# Patient Record
Sex: MALE | Race: BLACK OR AFRICAN AMERICAN | NOT HISPANIC OR LATINO | Employment: UNEMPLOYED | URBAN - METROPOLITAN AREA
[De-identification: names, ages, dates, MRNs, and addresses within clinical notes are randomized per-mention and may not be internally consistent; named-entity substitution may affect disease eponyms.]

---

## 2019-02-22 ENCOUNTER — TELEPHONE (OUTPATIENT)
Dept: TRANSPLANT | Facility: CLINIC | Age: 54
End: 2019-02-22

## 2019-02-22 DIAGNOSIS — I50.9 CONGESTIVE HEART FAILURE, UNSPECIFIED HF CHRONICITY, UNSPECIFIED HEART FAILURE TYPE: Primary | ICD-10-CM

## 2019-02-25 NOTE — TELEPHONE ENCOUNTER
Spoke to Anahi with Dr Chou's office. Clinic notes requested and asking for clarification about reason for referral.

## 2019-02-25 NOTE — TELEPHONE ENCOUNTER
REFERRAL NOTE:    Jose Collado has been referred to the pre-heart transplant office for consideration for orthotopic heart transplantation by Dr CHUCKY Iraheta. Patient's appointments have been scheduled for 03/01/19.  Information was provided and questions were answered. AHF/ Transplant Handout, apppointment letter, and campus map was mailed to the patient. Pleasant. My contact information was given. Records received from referring provider,scanned into [email or web address] and sent to be scanned into Epic. Spoke to Melanie from referring providers office. Informed of appointment as scheduled with Dr Real. Call PRN.

## 2019-03-01 ENCOUNTER — INITIAL CONSULT (OUTPATIENT)
Dept: TRANSPLANT | Facility: CLINIC | Age: 54
End: 2019-03-01
Payer: COMMERCIAL

## 2019-03-01 ENCOUNTER — DOCUMENTATION ONLY (OUTPATIENT)
Dept: TRANSPLANT | Facility: CLINIC | Age: 54
End: 2019-03-01

## 2019-03-01 ENCOUNTER — HOSPITAL ENCOUNTER (OUTPATIENT)
Dept: PULMONOLOGY | Facility: CLINIC | Age: 54
Discharge: HOME | End: 2019-03-01
Payer: COMMERCIAL

## 2019-03-01 ENCOUNTER — HOSPITAL ENCOUNTER (OUTPATIENT)
Dept: CARDIOLOGY | Facility: CLINIC | Age: 54
Discharge: HOME | End: 2019-03-01
Attending: INTERNAL MEDICINE
Payer: COMMERCIAL

## 2019-03-01 VITALS
HEART RATE: 60 BPM | WEIGHT: 189 LBS | DIASTOLIC BLOOD PRESSURE: 72 MMHG | HEIGHT: 77 IN | BODY MASS INDEX: 22.32 KG/M2 | SYSTOLIC BLOOD PRESSURE: 123 MMHG

## 2019-03-01 VITALS
WEIGHT: 185 LBS | HEIGHT: 77 IN | BODY MASS INDEX: 21.84 KG/M2 | BODY MASS INDEX: 22.41 KG/M2 | HEIGHT: 77 IN | HEART RATE: 60 BPM | DIASTOLIC BLOOD PRESSURE: 72 MMHG | SYSTOLIC BLOOD PRESSURE: 123 MMHG | WEIGHT: 189.81 LBS

## 2019-03-01 DIAGNOSIS — I48.0 PAROXYSMAL ATRIAL FIBRILLATION: ICD-10-CM

## 2019-03-01 DIAGNOSIS — I50.9 CONGESTIVE HEART FAILURE, UNSPECIFIED HF CHRONICITY, UNSPECIFIED HEART FAILURE TYPE: ICD-10-CM

## 2019-03-01 DIAGNOSIS — E78.2 MIXED HYPERLIPIDEMIA: ICD-10-CM

## 2019-03-01 DIAGNOSIS — I50.22 CHRONIC SYSTOLIC CONGESTIVE HEART FAILURE: Primary | ICD-10-CM

## 2019-03-01 DIAGNOSIS — I50.42 CHRONIC COMBINED SYSTOLIC AND DIASTOLIC CONGESTIVE HEART FAILURE: ICD-10-CM

## 2019-03-01 DIAGNOSIS — I25.3 LEFT VENTRICULAR ANEURYSM: ICD-10-CM

## 2019-03-01 DIAGNOSIS — Z95.810 ICD (IMPLANTABLE CARDIOVERTER-DEFIBRILLATOR) IN PLACE: ICD-10-CM

## 2019-03-01 DIAGNOSIS — I25.10 CORONARY ARTERY DISEASE INVOLVING NATIVE CORONARY ARTERY OF NATIVE HEART WITHOUT ANGINA PECTORIS: ICD-10-CM

## 2019-03-01 LAB
ASCENDING AORTA: 3.4 CM
AV INDEX (PROSTH): 0.48
AV MEAN GRADIENT: 4.26 MMHG
AV PEAK GRADIENT: 8.18 MMHG
AV VALVE AREA: 1.66 CM2
AV VELOCITY RATIO: 0.52
BSA FOR ECHO PROCEDURE: 2.16 M2
CV ECHO LV RWT: 0.28 CM
DOP CALC AO PEAK VEL: 1.43 M/S
DOP CALC AO VTI: 27.43 CM
DOP CALC LVOT AREA: 3.43 CM2
DOP CALC LVOT DIAMETER: 2.09 CM
DOP CALC LVOT PEAK VEL: 0.75 M/S
DOP CALC LVOT STROKE VOLUME: 45.47 CM3
DOP CALCLVOT PEAK VEL VTI: 13.26 CM
E WAVE DECELERATION TIME: 166.09 MSEC
E/A RATIO: 0.94
E/E' RATIO: 8.73
ECHO LV POSTERIOR WALL: 0.85 CM (ref 0.6–1.1)
FRACTIONAL SHORTENING: 18 % (ref 28–44)
INTERVENTRICULAR SEPTUM: 0.71 CM (ref 0.6–1.1)
IVRT: 0.16 MSEC
LA MAJOR: 5.74 CM
LA MINOR: 5.88 CM
LA WIDTH: 4.44 CM
LEFT ATRIUM SIZE: 4.79 CM
LEFT ATRIUM VOLUME INDEX: 48.1 ML/M2
LEFT ATRIUM VOLUME: 105.01 CM3
LEFT INTERNAL DIMENSION IN SYSTOLE: 4.99 CM (ref 2.1–4)
LEFT VENTRICLE DIASTOLIC VOLUME INDEX: 84.14 ML/M2
LEFT VENTRICLE DIASTOLIC VOLUME: 183.72 ML
LEFT VENTRICLE MASS INDEX: 83.8 G/M2
LEFT VENTRICLE SYSTOLIC VOLUME INDEX: 53.8 ML/M2
LEFT VENTRICLE SYSTOLIC VOLUME: 117.57 ML
LEFT VENTRICULAR INTERNAL DIMENSION IN DIASTOLE: 6.05 CM (ref 3.5–6)
LEFT VENTRICULAR MASS: 183.03 G
LV LATERAL E/E' RATIO: 8
LV SEPTAL E/E' RATIO: 9.6
MV PEAK A VEL: 0.51 M/S
MV PEAK E VEL: 0.48 M/S
PULM VEIN S/D RATIO: 0.83
PV PEAK D VEL: 0.35 M/S
PV PEAK S VEL: 0.29 M/S
RA MAJOR: 5.23 CM
RA PRESSURE: 3 MMHG
RA WIDTH: 3.72 CM
RIGHT VENTRICULAR END-DIASTOLIC DIMENSION: 4.56 CM
RV TISSUE DOPPLER FREE WALL SYSTOLIC VELOCITY 1 (APICAL 4 CHAMBER VIEW): 9.62 M/S
SINUS: 2.96 CM
STJ: 2.85 CM
TDI LATERAL: 0.06
TDI SEPTAL: 0.05
TDI: 0.06
TRICUSPID ANNULAR PLANE SYSTOLIC EXCURSION: 1.59 CM

## 2019-03-01 PROCEDURE — 99999 PR PBB SHADOW E&M-EST. PATIENT-LVL III: ICD-10-PCS | Mod: PBBFAC,TXP,, | Performed by: INTERNAL MEDICINE

## 2019-03-01 PROCEDURE — 93306 TTE W/DOPPLER COMPLETE: CPT | Mod: NTX,S$GLB,, | Performed by: INTERNAL MEDICINE

## 2019-03-01 PROCEDURE — 93306 TRANSTHORACIC ECHO (TTE) COMPLETE (CUPID ONLY): ICD-10-PCS | Mod: NTX,S$GLB,, | Performed by: INTERNAL MEDICINE

## 2019-03-01 PROCEDURE — 99204 OFFICE O/P NEW MOD 45 MIN: CPT | Mod: S$GLB,TXP,, | Performed by: INTERNAL MEDICINE

## 2019-03-01 PROCEDURE — 94618 PULMONARY STRESS TESTING: CPT | Mod: NTX,S$GLB,, | Performed by: INTERNAL MEDICINE

## 2019-03-01 PROCEDURE — 3008F PR BODY MASS INDEX (BMI) DOCUMENTED: ICD-10-PCS | Mod: CPTII,S$GLB,TXP, | Performed by: INTERNAL MEDICINE

## 2019-03-01 PROCEDURE — 99999 PR PBB SHADOW E&M-EST. PATIENT-LVL III: CPT | Mod: PBBFAC,TXP,, | Performed by: INTERNAL MEDICINE

## 2019-03-01 PROCEDURE — 99204 PR OFFICE/OUTPT VISIT, NEW, LEVL IV, 45-59 MIN: ICD-10-PCS | Mod: S$GLB,TXP,, | Performed by: INTERNAL MEDICINE

## 2019-03-01 PROCEDURE — 94618 PULMONARY STRESS TESTING: ICD-10-PCS | Mod: NTX,S$GLB,, | Performed by: INTERNAL MEDICINE

## 2019-03-01 PROCEDURE — 3008F BODY MASS INDEX DOCD: CPT | Mod: CPTII,S$GLB,TXP, | Performed by: INTERNAL MEDICINE

## 2019-03-01 RX ORDER — SERTRALINE HYDROCHLORIDE 25 MG/1
25 TABLET, FILM COATED ORAL DAILY
COMMUNITY
Start: 2018-06-18 | End: 2019-11-25 | Stop reason: SDUPTHER

## 2019-03-01 RX ORDER — POTASSIUM CHLORIDE 20 MEQ/1
40 TABLET, EXTENDED RELEASE ORAL DAILY
COMMUNITY
Start: 2018-06-19

## 2019-03-01 RX ORDER — METOPROLOL SUCCINATE 100 MG/1
100 TABLET, EXTENDED RELEASE ORAL 2 TIMES DAILY
COMMUNITY
Start: 2018-08-14 | End: 2019-10-08 | Stop reason: CLARIF

## 2019-03-01 RX ORDER — TRAZODONE HYDROCHLORIDE 50 MG/1
50 TABLET ORAL NIGHTLY
COMMUNITY
Start: 2018-06-18 | End: 2019-12-17 | Stop reason: SDUPTHER

## 2019-03-01 RX ORDER — ALBUTEROL SULFATE 90 UG/1
AEROSOL, METERED RESPIRATORY (INHALATION)
COMMUNITY
Start: 2018-09-24 | End: 2019-09-09

## 2019-03-01 RX ORDER — ASPIRIN 81 MG/1
81 TABLET ORAL DAILY
COMMUNITY
Start: 2018-06-19 | End: 2022-08-16 | Stop reason: SDUPTHER

## 2019-03-01 RX ORDER — FUROSEMIDE 40 MG/1
40 TABLET ORAL DAILY
COMMUNITY
Start: 2019-02-23 | End: 2020-08-13

## 2019-03-01 RX ORDER — ATORVASTATIN CALCIUM 80 MG/1
80 TABLET, FILM COATED ORAL DAILY
COMMUNITY
Start: 2018-06-19 | End: 2020-03-13

## 2019-03-01 RX ORDER — SPIRONOLACTONE 50 MG/1
50 TABLET, FILM COATED ORAL DAILY
COMMUNITY
Start: 2019-02-23 | End: 2020-02-18

## 2019-03-01 NOTE — PROGRESS NOTES
Subjective:   Initial evaluation of heart transplant candidacy.    HPI:  Mr. Collado is a 53 y.o. year old Black or  male who has presents to be considered for advanced surgical options (LVAD/OHT).    53 year old male with history of HTN s/p AMI (chest pain) LAD stent in June 2018. He did well but was hospitalized with fluid overload twice and recently in February 2019. Now he is on enrtesto that was added to medical therapy including aldactone. See notes below (although I have not seen it there is an echo with 20% EF). S/p ICD    Six minute 1200 feet    At this moment he is FC II NYHA and no PND or orthopnea. No fluid retention in lower extremities        2018   o The left ventricle is mildly dilated.   o Ejection Fraction = 50-55%.   o The left ventricular ejection fraction is grossly normal.   o There is mild concentric left ventricular hypertrophy.   o There is mild anterior wall hypokinesis.        - Coronary angiography data  Dominance :co-dominant system.   Left main: Large caliber vessel, divides into the left anterior   descending and left circumflex. LM is Non-obstructive   Left anterior descending: Large caliber vessel giving rise to 3   major diagonals. Mid LAD 99% stenosis. Other wise LAD and   diagonals are Non-obstructive   Left circumflex:  Medium  caliber vessel giving rise to 2 major   obtuse marginals. LCX  And OM are Non-obstructive   Right coronary artery: Small caliber vessel. Distal RCA .   Distal RCA fills via collaterals from right to right.     - Left Heart Catheterization data  Aortic Pressure: 98/76 mmHg  LVEDP 31 mmHg  LV gram 55-60 %  Ao-LV gradients: none.     - IVUS data  Pre PCI IVUS data:  Proximal reference luminal diameter:  3.5 mm  Distal reference luminal diameter: 3.5 mm     Percutaneous Coronary Intervention  Successful PCI of Mid left anterior descending  99 % stenosis was   reduced to 0 % by pre dilatation with 2.5 x 15 mm balloon   followed by placement of  3.5 x 24 mm Promus Premier SENAIT. post   dilated with 3.5 x 20 mm balloon with excellent angiographic   results. IVUS guided.     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  - Succesful PCI of Mid left anterior descending artery with one   SENAIT   - Distal RCA  (small vessel)  - Increased left ventricular end diastolic pressure.    Clinic visit January 2019    This is a 53-year-old male who recently underwent LAD stenting for ST elevation MI, ischemic cardiomyopathy with EF of 35-40% post STEMI, hospital course complicated by paroxysmal atrial fibrillation and cardiogenic shock. his most recent EF is 20-25%. He is status post ICD placement for primary prevention.           Past Medical History:   Diagnosis Date    Acute hypoxemic respiratory failure     Acute kidney injury     Aneurysm     CHF (congestive heart failure)     Coronary artery disease     Diabetic amyotrophy associated with type 2 diabetes mellitus     Edema due to congestive heart failure     Fever     Heart failure     High blood pressure     History of respiratory failure     Hypertension     ICD (implantable cardioverter-defibrillator) in place     Inflammation of lung     Irregular heartbeat     SOB (shortness of breath)      Past Surgical History:   Procedure Laterality Date    atrial difibrillator  11/26/2018       Family History   Problem Relation Age of Onset    Hypertension Mother        Review of Systems   Constitution: Negative. Negative for chills, decreased appetite, diaphoresis, fever, weakness, malaise/fatigue, night sweats, weight gain and weight loss.   HENT: Negative.    Eyes: Negative.    Cardiovascular: Negative for chest pain, claudication, cyanosis, dyspnea on exertion, irregular heartbeat, leg swelling, near-syncope, orthopnea and palpitations.   Respiratory: Negative.  Negative for cough, hemoptysis, shortness of breath, sleep  "disturbances due to breathing, snoring, sputum production and wheezing.    Endocrine: Negative.    Hematologic/Lymphatic: Negative.    Skin: Negative.    Musculoskeletal: Negative.    Gastrointestinal: Negative.  Negative for abdominal pain and diarrhea.   Genitourinary: Negative.    Neurological: Negative.    Psychiatric/Behavioral: Negative.        Objective:   Blood pressure 123/72, pulse 60, height 6' 5" (1.956 m), weight 86.1 kg (189 lb 13.1 oz).body mass index is 22.51 kg/m².    Physical Exam   Constitutional: He is oriented to person, place, and time. He appears well-developed and well-nourished.   HENT:   Head: Normocephalic and atraumatic.   Eyes: Conjunctivae and EOM are normal. Pupils are equal, round, and reactive to light.   Neck: Normal range of motion. Neck supple. JVD present.   Cardiovascular: Normal rate and regular rhythm. PMI is displaced. Exam reveals no gallop and no friction rub.   No murmur heard.  Pulmonary/Chest: Breath sounds normal. No respiratory distress. He has no wheezes. He has no rales. He exhibits no tenderness.   Abdominal: Soft. Bowel sounds are normal. He exhibits no distension and no mass. There is no hepatosplenomegaly, splenomegaly or hepatomegaly. There is no tenderness. There is no rebound, no guarding and no CVA tenderness.   No hepatoslenomegaly   Musculoskeletal: Normal range of motion. He exhibits no edema or tenderness.   Neurological: He is alert and oriented to person, place, and time. He has normal reflexes. No cranial nerve deficit. He exhibits normal muscle tone. Coordination normal.   Skin: Skin is warm and dry.   Psychiatric: He has a normal mood and affect.       Labs:      Chemistry        Component Value Date/Time     03/01/2019 0813    K 4.8 03/01/2019 0813     03/01/2019 0813    CO2 29 03/01/2019 0813    BUN 19 03/01/2019 0813    CREATININE 1.2 03/01/2019 0813     (H) 03/01/2019 0813        Component Value Date/Time    CALCIUM 9.7 " 03/01/2019 0813    ALKPHOS 288 (H) 03/01/2019 0813    AST 50 (H) 03/01/2019 0813    ALT 46 (H) 03/01/2019 0813    BILITOT 1.0 03/01/2019 0813    ESTGFRAFRICA >60.0 03/01/2019 0813    EGFRNONAA >60.0 03/01/2019 0813          No results found for: MG  Lab Results   Component Value Date    WBC 4.77 03/01/2019    HGB 13.2 (L) 03/01/2019    HCT 43.6 03/01/2019    MCV 83 03/01/2019     03/01/2019     BNP   Date Value Ref Range Status   03/01/2019 563 (H) 0 - 99 pg/mL Final     Comment:     Values of less than 100 pg/ml are consistent with non-CHF populations.     No results found for this or any previous visit.    Labs were reviewed with the patient.    Assessment:      1. Chronic systolic congestive heart failure    2. Chronic combined systolic and diastolic congestive heart failure    3. Coronary artery disease involving native coronary artery of native heart without angina pectoris    4. ICD (implantable cardioverter-defibrillator) in place    5. Left ventricular aneurysm    6. Mixed hyperlipidemia    7. Paroxysmal atrial fibrillation        Plan:     HFrEF 1200 feet. We will perform CPX and PET scan (rule out ischemia)    RTC 3 weeks    We will reassess candidacy      Patient is now NYHA II ACC stage D  Recommend 2 gram sodium restriction and 1500cc fluid restriction.  Encourage physical activity with graded exercise program.  Requested patient to weigh themselves daily, and to notify us if their weight increases by more than 3 lbs in 1 day or 5 lbs in 1 week.     Transplant Candidacy: Patient is a 53 y.o. year old male with heart failure is being seen for possible LVAD and OHT. In my opinion, he is  an excellent LVAD and OHT candidate. Patient did  meet with MCS and/or pre-transplant coordinator at the end of this visit for workup. he is  scheduled for risk stratification testing with CPX/RHC. The patient will follow up with pre-transplant.    UNOS Patient Status  Functional Status: 80% - Normal activity with  effort: some symptoms of disease  Physical Capacity: Limited Mobility  Working for Income: No  If no, reason not working: Disability    Rufino Real MD

## 2019-03-01 NOTE — LETTER
March 1, 2019        José Antonio Iraheta  [address] [address]  Phone: [phone]  Fax: [phone]             [facility] [address]  [facility] [address]  Phone: [phone]   Patient: Jose Collado   MR Number: [id number]   YOB: 1965   Date of Visit: 3/1/2019       Dear Dr. José Antonio Iraheta    Thank you for referring Jose Collado to me for evaluation. Attached you will find relevant portions of my assessment and plan of care.    If you have questions, please do not hesitate to call me. I look forward to following Jose Collado along with you.    Sincerely,    Rufino Real MD    Enclosure    If you would like to receive this communication electronically, please contact [email or web address] or [phone] to request [employer] access.    [employer] is a tool which provides read-only access to select patient information with whom you have a relationship. Its easy to use and provides real time access to review your patients record including encounter summaries, notes, results, and demographic information.    If you feel you have received this communication in error or would no longer like to receive these types of communications, please e-mail [email or web address]

## 2019-03-04 NOTE — PROCEDURES
Jose Collado is a 53 y.o.  male patient, who presents for a 6 minute walk test ordered by Rufino Real MD.  The diagnosis is Cardiomyopathy; Congestive Heart Failure.  The patient's BMI is 22 kg/m2.  Predicted distance (lower limit of normal) is 502.7 meters.      Test Results:    The test was completed without stopping.  The total time walked was 360 seconds.  During walking, the patient reported:  No complaints.  The patient used no assistive devices during testing.     03/01/2019---------Distance: 365.76 meters (1200 feet)     O2 Sat % Supplemental Oxygen Heart Rate Blood Pressure Sofia Scale   Pre-exercise  (Resting) 98 % Room Air 60 bpm 124/89 mmHg 0   During Exercise 94 % Room Air 60 bpm 121/79 mmHg 0   Post-exercise  (Recovery) 96 % Room Air  61 bpm       Recovery Time:  105 seconds    Performing nurse/tech:  Malinda CRT      PREVIOUS STUDY:   The patient has not had a previous study.      CLINICAL INTERPRETATION:  Six minute walk distance is 365.76 meters (1200 feet) with no dyspnea.  During exercise, there was desaturation while breathing room air.  Both blood pressure and heart rate remained stable with walking.  The patient did not report non-pulmonary symptoms during exercise.  No previous study performed.  Based upon age and body mass index, exercise capacity is less than predicted.

## 2019-03-08 DIAGNOSIS — I50.22 CHRONIC SYSTOLIC CONGESTIVE HEART FAILURE: Primary | ICD-10-CM

## 2019-03-11 ENCOUNTER — PATIENT MESSAGE (OUTPATIENT)
Dept: TRANSPLANT | Facility: CLINIC | Age: 54
End: 2019-03-11

## 2019-04-01 ENCOUNTER — CLINICAL SUPPORT (OUTPATIENT)
Dept: CARDIOLOGY | Facility: CLINIC | Age: 54
End: 2019-04-01
Attending: INTERNAL MEDICINE
Payer: COMMERCIAL

## 2019-04-01 ENCOUNTER — OFFICE VISIT (OUTPATIENT)
Dept: TRANSPLANT | Facility: CLINIC | Age: 54
End: 2019-04-01
Payer: COMMERCIAL

## 2019-04-01 ENCOUNTER — HOSPITAL ENCOUNTER (OUTPATIENT)
Dept: CARDIOLOGY | Facility: CLINIC | Age: 54
Discharge: HOME | End: 2019-04-01
Attending: INTERNAL MEDICINE
Payer: COMMERCIAL

## 2019-04-01 ENCOUNTER — TELEPHONE (OUTPATIENT)
Dept: TRANSPLANT | Facility: CLINIC | Age: 54
End: 2019-04-01

## 2019-04-01 ENCOUNTER — EDUCATION (OUTPATIENT)
Dept: TRANSPLANT | Facility: CLINIC | Age: 54
End: 2019-04-01

## 2019-04-01 VITALS — WEIGHT: 206.38 LBS | HEIGHT: 77 IN | BODY MASS INDEX: 24.37 KG/M2

## 2019-04-01 VITALS
DIASTOLIC BLOOD PRESSURE: 83 MMHG | WEIGHT: 206.81 LBS | HEIGHT: 78 IN | BODY MASS INDEX: 23.93 KG/M2 | HEART RATE: 59 BPM | SYSTOLIC BLOOD PRESSURE: 130 MMHG

## 2019-04-01 DIAGNOSIS — I50.22 CHRONIC SYSTOLIC CONGESTIVE HEART FAILURE: ICD-10-CM

## 2019-04-01 DIAGNOSIS — I48.0 PAROXYSMAL ATRIAL FIBRILLATION: ICD-10-CM

## 2019-04-01 DIAGNOSIS — I25.3 LEFT VENTRICULAR ANEURYSM: ICD-10-CM

## 2019-04-01 DIAGNOSIS — Z95.810 ICD (IMPLANTABLE CARDIOVERTER-DEFIBRILLATOR) IN PLACE: ICD-10-CM

## 2019-04-01 DIAGNOSIS — I25.10 CORONARY ARTERY DISEASE INVOLVING NATIVE CORONARY ARTERY OF NATIVE HEART WITHOUT ANGINA PECTORIS: ICD-10-CM

## 2019-04-01 DIAGNOSIS — I50.42 CHRONIC COMBINED SYSTOLIC AND DIASTOLIC CONGESTIVE HEART FAILURE: Primary | ICD-10-CM

## 2019-04-01 DIAGNOSIS — E78.2 MIXED HYPERLIPIDEMIA: ICD-10-CM

## 2019-04-01 LAB
CV STRESS BASE HR: 60 BPM
DIASTOLIC BLOOD PRESSURE: 91 MMHG
OHS CV CPX 1 MINUTE RECOVERY HEART RATE: 66 BPM
OHS CV CPX 85 PERCENT MAX PREDICTED HEART RATE MALE: 143
OHS CV CPX ANAEROBIC THRESHOLD DIASTOLIC BLOOD PRESSURE: 81 MMHG
OHS CV CPX ANAEROBIC THRESHOLD HEART RATE: 79
OHS CV CPX ANAEROBIC THRESHOLD RATE PRESSURE PRODUCT: 9480
OHS CV CPX ANAEROBIC THRESHOLD SYSTOLIC BLOOD PRESSURE: 120
OHS CV CPX DATA GRADE - AT: 5.9
OHS CV CPX DATA GRADE - PEAK: 7
OHS CV CPX DATA O2 SAT - PEAK: 92
OHS CV CPX DATA O2 SAT - REST: 98
OHS CV CPX DATA SPEED - AT: 2.1
OHS CV CPX DATA SPEED - PEAK: 2.4
OHS CV CPX DATA TIME - AT: 2.81
OHS CV CPX DATA TIME - PEAK: 3.3
OHS CV CPX DATA VE/VCO2 - AT: 28
OHS CV CPX DATA VE/VCO2 - PEAK: 37
OHS CV CPX DATA VE/VO2 - AT: 27
OHS CV CPX DATA VE/VO2 - PEAK: 44
OHS CV CPX DATA VO2 - AT: 12.2
OHS CV CPX DATA VO2 - PEAK: 12.7
OHS CV CPX DATA VO2 - REST: 3.7
OHS CV CPX FEV1/FVC: 0.6
OHS CV CPX FORCED EXPIRATORY VOLUME: 2.38
OHS CV CPX FORCED VITAL CAPACITY (FVC): 3.95
OHS CV CPX HIGHEST VO: 34.6
OHS CV CPX MAX PREDICTED HEART RATE: 168
OHS CV CPX MAXIMAL VOLUNTARY VENTILATION (MVV) PREDICTED: 95.2
OHS CV CPX MAXIMAL VOLUNTARY VENTILATION (MVV): 76
OHS CV CPX MAXIUMUM EXERCISE VENTILATION (VE MAX): 50
OHS CV CPX PATIENT AGE: 52
OHS CV CPX PATIENT HEIGHT IN: 77
OHS CV CPX PATIENT IS FEMALE AGE 11-19: 0
OHS CV CPX PATIENT IS FEMALE AGE GREATER THAN 19: 0
OHS CV CPX PATIENT IS FEMALE AGE LESS THAN 11: 0
OHS CV CPX PATIENT IS FEMALE: 0
OHS CV CPX PATIENT IS MALE AGE 11-25: 0
OHS CV CPX PATIENT IS MALE AGE GREATER THAN 25: 1
OHS CV CPX PATIENT IS MALE AGE LESS THAN 11: 0
OHS CV CPX PATIENT IS MALE GREATER THAN 18: 1
OHS CV CPX PATIENT IS MALE LESS THAN OR EQUAL TO 18: 0
OHS CV CPX PATIENT IS MALE: 1
OHS CV CPX PATIENT WEIGHT RETURNED IN OZ: 3301.61
OHS CV CPX PEAK DIASTOLIC BLOOD PRESSURE: 70 MMHG
OHS CV CPX PEAK HEAR RATE: 82 BPM
OHS CV CPX PEAK RATE PRESSURE PRODUCT: 9758
OHS CV CPX PEAK SYSTOLIC BLOOD PRESSURE: 119 MMHG
OHS CV CPX PERCENT BODY FAT: 18.7
OHS CV CPX PERCENT MAX PREDICTED HEART RATE ACHIEVED: 49
OHS CV CPX PREDICTED VO2: 34.6 ML/KG/MIN
OHS CV CPX RATE PRESSURE PRODUCT PRESENTING: 7680
OHS CV CPX REST PET CO2: 34
OHS CV CPX VE/VCO2 SLOPE: 28
STRESS ECHO POST EXERCISE DUR MIN: 3 MIN
STRESS ECHO POST EXERCISE DUR SEC: 18
SYSTOLIC BLOOD PRESSURE: 128 MMHG

## 2019-04-01 PROCEDURE — 94621 CARDIOPULMONARY EXERCISE TESTING (CUPID ONLY): ICD-10-PCS | Mod: NTX,S$GLB,, | Performed by: INTERNAL MEDICINE

## 2019-04-01 PROCEDURE — 99215 OFFICE O/P EST HI 40 MIN: CPT | Mod: S$PBB,NTX,, | Performed by: INTERNAL MEDICINE

## 2019-04-01 PROCEDURE — 94621 CARDIOPULM EXERCISE TESTING: CPT | Mod: NTX,S$GLB,, | Performed by: INTERNAL MEDICINE

## 2019-04-01 PROCEDURE — 99999 PR PBB SHADOW E&M-EST. PATIENT-LVL II: CPT | Mod: PBBFAC,TXP,, | Performed by: INTERNAL MEDICINE

## 2019-04-01 PROCEDURE — 99215 PR OFFICE/OUTPT VISIT, EST, LEVL V, 40-54 MIN: ICD-10-PCS | Mod: S$PBB,NTX,, | Performed by: INTERNAL MEDICINE

## 2019-04-01 PROCEDURE — 99999 PR PBB SHADOW E&M-EST. PATIENT-LVL II: ICD-10-PCS | Mod: PBBFAC,TXP,, | Performed by: INTERNAL MEDICINE

## 2019-04-01 NOTE — PROGRESS NOTES
PRE-EDUCATION BOOKLET NOTE:    Met with oJse Collado and had a brief discussion regarding the advanced heart failure evaluation process including options for heart transplantation and/or left ventricular assist device (LVAD) implantation.     Heart Transplant Educational Booklet given to patient, which included the following handouts:  · Treatment options for Advanced Heart Failure: A Referral Guide for patients  · Pre Heart Transplant Coordinator Team Contact Information   · Recipient Informed Consent   · Wellness Contract  · Multiple Listing Protocol and UNOS toll free numbers   · VAD Education Packet   · Advanced Directives  · 8 Step Plan for Heart Failure Patients     Significant History:  · Prior sternotomies: No  · ICD:  Yes, Medtronic 11/26/18  · Blood transfusions:  No  · Angiography:  Yes, through wrist  · Colonoscopy:  No  · Tobacco history:  No    Patient was accompanied by his spouse, Angie.  Question and answer session was conducted.  Patient was advised to bring the educational packet to future appointments and/or admissions.  Patient was encouraged to contact the coordinator team with any questions or concerns.  Contact information provided.  Pt was tearful at end of appointment.  Emotional support provided.  Understanding verbalized.    Will schedule eval upon receipt of financial clearance for outpatient eval.

## 2019-04-01 NOTE — PROGRESS NOTES
Subjective:   Initial evaluation of heart transplant candidacy.    HPI:  Mr. Collado is a 53 y.o. year old Black or  male who has presents to be considered for advanced surgical options (LVAD/OHT).    53 year old male with history of HTN s/p AMI (chest pain) LAD stent in June 2018. He did well but was hospitalized with fluid overload twice and recently in February 2019. Now he is on enrtesto that was added to medical therapy including aldactone. See notes below (although I have not seen it there is an echo with 20% EF). S/p ICD    Six minute 1200 feet    At this moment he is FC II NYHA and no PND or orthopnea. No fluid retention in lower extremities    Feels better on entresto less shortness of breath more stamina    Preliminary CPX    Peak VO2 12.7 cc/kg/min Ve Vco2 28 2018   o The left ventricle is mildly dilated.   o Ejection Fraction = 50-55%.   o The left ventricular ejection fraction is grossly normal.   o There is mild concentric left ventricular hypertrophy.   o There is mild anterior wall hypokinesis.        - Coronary angiography data  Dominance :co-dominant system.   Left main: Large caliber vessel, divides into the left anterior   descending and left circumflex. LM is Non-obstructive   Left anterior descending: Large caliber vessel giving rise to 3   major diagonals. Mid LAD 99% stenosis. Other wise LAD and   diagonals are Non-obstructive   Left circumflex:  Medium  caliber vessel giving rise to 2 major   obtuse marginals. LCX  And OM are Non-obstructive   Right coronary artery: Small caliber vessel. Distal RCA .   Distal RCA fills via collaterals from right to right.     - Left Heart Catheterization data  Aortic Pressure: 98/76 mmHg  LVEDP 31 mmHg  LV gram 55-60 %  Ao-LV gradients: none.     - IVUS data  Pre PCI IVUS data:  Proximal reference luminal diameter:  3.5 mm  Distal reference luminal diameter: 3.5 mm     Percutaneous Coronary Intervention  Successful PCI of Mid left  anterior descending  99 % stenosis was   reduced to 0 % by pre dilatation with 2.5 x 15 mm balloon   followed by placement of 3.5 x 24 mm Promus Premier SENAIT. post   dilated with 3.5 x 20 mm balloon with excellent angiographic   results. IVUS guided.     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  - Succesful PCI of Mid left anterior descending artery with one   SENAIT   - Distal RCA  (small vessel)  - Increased left ventricular end diastolic pressure.    Clinic visit January 2019    This is a 53-year-old male who recently underwent LAD stenting for ST elevation MI, ischemic cardiomyopathy with EF of 35-40% post STEMI, hospital course complicated by paroxysmal atrial fibrillation and cardiogenic shock. his most recent EF is 20-25%. He is status post ICD placement for primary prevention.           Past Medical History:   Diagnosis Date    Acute hypoxemic respiratory failure     Acute kidney injury     Aneurysm     CHF (congestive heart failure)     Coronary artery disease     Diabetic amyotrophy associated with type 2 diabetes mellitus     Edema due to congestive heart failure     Fever     Heart failure     High blood pressure     History of respiratory failure     Hypertension     ICD (implantable cardioverter-defibrillator) in place     Inflammation of lung     Irregular heartbeat     SOB (shortness of breath)      Past Surgical History:   Procedure Laterality Date    atrial difibrillator  11/26/2018       Family History   Problem Relation Age of Onset    Hypertension Mother        Review of Systems   Constitution: Negative. Negative for chills, decreased appetite, diaphoresis, fever, malaise/fatigue, night sweats, weight gain and weight loss.   HENT: Negative.    Eyes: Negative.    Cardiovascular: Negative for chest pain, claudication, cyanosis, dyspnea on exertion, irregular heartbeat, leg swelling, near-syncope,  "orthopnea and palpitations.   Respiratory: Negative.  Negative for cough, hemoptysis, shortness of breath, sleep disturbances due to breathing, snoring, sputum production and wheezing.    Endocrine: Negative.    Hematologic/Lymphatic: Negative.    Skin: Negative.    Musculoskeletal: Negative.    Gastrointestinal: Negative.  Negative for abdominal pain and diarrhea.   Genitourinary: Negative.    Neurological: Negative.  Negative for weakness.   Psychiatric/Behavioral: Negative.        Objective:   Blood pressure 130/83, pulse (!) 59, height 6' 5.5" (1.969 m), weight 93.8 kg (206 lb 12.7 oz).body mass index is 24.21 kg/m².    Physical Exam   Constitutional: He is oriented to person, place, and time. He appears well-developed and well-nourished.   HENT:   Head: Normocephalic and atraumatic.   Eyes: Pupils are equal, round, and reactive to light. Conjunctivae and EOM are normal.   Neck: Normal range of motion. Neck supple. JVD present.   Cardiovascular: Normal rate and regular rhythm. PMI is displaced. Exam reveals no gallop and no friction rub.   No murmur heard.  Pulmonary/Chest: Breath sounds normal. No respiratory distress. He has no wheezes. He has no rales. He exhibits no tenderness.   Abdominal: Soft. Bowel sounds are normal. He exhibits no distension and no mass. There is no hepatosplenomegaly, splenomegaly or hepatomegaly. There is no tenderness. There is no rebound, no guarding and no CVA tenderness.   No hepatoslenomegaly   Musculoskeletal: Normal range of motion. He exhibits no edema or tenderness.   Neurological: He is alert and oriented to person, place, and time. He has normal reflexes. No cranial nerve deficit. He exhibits normal muscle tone. Coordination normal.   Skin: Skin is warm and dry.   Psychiatric: He has a normal mood and affect.       Labs:      Chemistry        Component Value Date/Time     03/01/2019 0813    K 4.8 03/01/2019 0813     03/01/2019 0813    CO2 29 03/01/2019 0813    " BUN 19 03/01/2019 0813    CREATININE 1.2 03/01/2019 0813     (H) 03/01/2019 0813        Component Value Date/Time    CALCIUM 9.7 03/01/2019 0813    ALKPHOS 288 (H) 03/01/2019 0813    AST 50 (H) 03/01/2019 0813    ALT 46 (H) 03/01/2019 0813    BILITOT 1.0 03/01/2019 0813    ESTGFRAFRICA >60.0 03/01/2019 0813    EGFRNONAA >60.0 03/01/2019 0813          No results found for: MG  Lab Results   Component Value Date    WBC 4.77 03/01/2019    HGB 13.2 (L) 03/01/2019    HCT 43.6 03/01/2019    MCV 83 03/01/2019     03/01/2019     BNP   Date Value Ref Range Status   03/01/2019 563 (H) 0 - 99 pg/mL Final     Comment:     Values of less than 100 pg/ml are consistent with non-CHF populations.     No results found for this or any previous visit.    Labs were reviewed with the patient.    Assessment:      1. Chronic combined systolic and diastolic congestive heart failure    2. Chronic systolic congestive heart failure    3. Coronary artery disease involving native coronary artery of native heart without angina pectoris    4. ICD (implantable cardioverter-defibrillator) in place    5. Left ventricular aneurysm    6. Mixed hyperlipidemia    7. Paroxysmal atrial fibrillation        Plan:     HFrEF on entresto improving However CPX reveals a low peak VO2. Unable to do PET scan (brian latif)    Work up for advanced therapies      Patient is now NYHA II ACC stage D  Recommend 2 gram sodium restriction and 1500cc fluid restriction.  Encourage physical activity with graded exercise program.  Requested patient to weigh themselves daily, and to notify us if their weight increases by more than 3 lbs in 1 day or 5 lbs in 1 week.     Transplant Candidacy: Patient is a 53 y.o. year old male with heart failure is being seen for possible LVAD and OHT. In my opinion, he is  an excellent LVAD and OHT candidate. Patient did  meet with MCS and/or pre-transplant coordinator at the end of this visit for workup. he is  scheduled for  risk stratification testing with CPX/RHC. The patient will follow up with pre-transplant.    UNOS Patient Status  Functional Status: 80% - Normal activity with effort: some symptoms of disease  Physical Capacity: Limited Mobility  Working for Income: No  If no, reason not working: Disability    Rufino Real MD

## 2019-04-01 NOTE — LETTER
April 1, 2019        José Antonio Iraheta  [address] [address]  Phone: [phone]  Fax: [phone]             [facility] [address]  [facility] [address]  Phone: [phone]   Patient: Jose Collado   MR Number: [id number]   YOB: 1965   Date of Visit: 4/1/2019       Dear Dr. José Antonio Iraheta    Thank you for referring Jose Collado to me for evaluation. Attached you will find relevant portions of my assessment and plan of care.    If you have questions, please do not hesitate to call me. I look forward to following Jose Collado along with you.    Sincerely,    Rufino Real MD    Enclosure    If you would like to receive this communication electronically, please contact [email or web address] or [phone] to request [employer] Link access.    [employer] Link is a tool which provides read-only access to select patient information with whom you have a relationship. Its easy to use and provides real time access to review your patients record including encounter summaries, notes, results, and demographic information.    If you feel you have received this communication in error or would no longer like to receive these types of communications, please e-mail [email or web address]

## 2019-04-29 ENCOUNTER — RESEARCH ENCOUNTER (OUTPATIENT)
Dept: RESEARCH | Facility: HOSPITAL | Age: 54
End: 2019-04-29

## 2019-04-29 ENCOUNTER — DOCUMENTATION ONLY (OUTPATIENT)
Dept: TRANSPLANT | Facility: CLINIC | Age: 54
End: 2019-04-29

## 2019-04-29 ENCOUNTER — OFFICE VISIT (OUTPATIENT)
Dept: TRANSPLANT | Facility: CLINIC | Age: 54
End: 2019-04-29
Payer: COMMERCIAL

## 2019-04-29 ENCOUNTER — CLINICAL SUPPORT (OUTPATIENT)
Dept: CARDIOLOGY | Facility: CLINIC | Age: 54
End: 2019-04-29
Attending: INTERNAL MEDICINE
Payer: COMMERCIAL

## 2019-04-29 VITALS
BODY MASS INDEX: 25.41 KG/M2 | DIASTOLIC BLOOD PRESSURE: 85 MMHG | SYSTOLIC BLOOD PRESSURE: 105 MMHG | HEART RATE: 60 BPM | DIASTOLIC BLOOD PRESSURE: 68 MMHG | HEART RATE: 60 BPM | HEIGHT: 77 IN | SYSTOLIC BLOOD PRESSURE: 129 MMHG | WEIGHT: 215.19 LBS

## 2019-04-29 DIAGNOSIS — I25.10 CORONARY ARTERY DISEASE INVOLVING NATIVE CORONARY ARTERY OF NATIVE HEART WITHOUT ANGINA PECTORIS: ICD-10-CM

## 2019-04-29 DIAGNOSIS — I48.0 PAROXYSMAL ATRIAL FIBRILLATION: ICD-10-CM

## 2019-04-29 DIAGNOSIS — E78.2 MIXED HYPERLIPIDEMIA: ICD-10-CM

## 2019-04-29 DIAGNOSIS — I25.3 LEFT VENTRICULAR ANEURYSM: ICD-10-CM

## 2019-04-29 DIAGNOSIS — I50.42 CHRONIC COMBINED SYSTOLIC AND DIASTOLIC CONGESTIVE HEART FAILURE: Primary | ICD-10-CM

## 2019-04-29 DIAGNOSIS — Z95.810 ICD (IMPLANTABLE CARDIOVERTER-DEFIBRILLATOR) IN PLACE: ICD-10-CM

## 2019-04-29 LAB
CV STRESS BASE HR: 60 BPM
DIASTOLIC BLOOD PRESSURE: 85 MMHG
END DIASTOLIC INDEX-HIGH: 170 ML/M2
END SYSTOLIC INDEX-HIGH: 70 ML/M2
NUC REST DIASTOLIC VOLUME INDEX: 206
NUC REST EJECTION FRACTION: 38
NUC REST SYSTOLIC VOLUME INDEX: 127
NUC STRESS DIASTOLIC VOLUME INDEX: 186
NUC STRESS EJECTION FRACTION: 41 %
NUC STRESS SYSTOLIC VOLUME INDEX: 110
OHS CV CPX 85 PERCENT MAX PREDICTED HEART RATE MALE: 143
OHS CV CPX ANAEROBIC THRESHOLD DIASTOLIC BLOOD PRESSURE: 71 MMHG
OHS CV CPX ANAEROBIC THRESHOLD HEART RATE: 60
OHS CV CPX ANAEROBIC THRESHOLD RATE PRESSURE PRODUCT: 5880
OHS CV CPX ANAEROBIC THRESHOLD SYSTOLIC BLOOD PRESSURE: 98
OHS CV CPX MAX PREDICTED HEART RATE: 168
OHS CV CPX PATIENT IS FEMALE: 0
OHS CV CPX PATIENT IS MALE: 1
OHS CV CPX RATE PRESSURE PRODUCT PRESENTING: 6960
RETIRED EF AND QEF - SEE NOTES: 51 %
SYSTOLIC BLOOD PRESSURE: 116 MMHG

## 2019-04-29 PROCEDURE — A9555 RB82 RUBIDIUM: HCPCS | Mod: NTX,S$GLB,, | Performed by: INTERNAL MEDICINE

## 2019-04-29 PROCEDURE — 99999 PR PBB SHADOW E&M-EST. PATIENT-LVL I: ICD-10-PCS | Mod: PBBFAC,TXP,,

## 2019-04-29 PROCEDURE — 93015 CV STRESS TEST SUPVJ I&R: CPT | Mod: NTX,S$GLB,, | Performed by: INTERNAL MEDICINE

## 2019-04-29 PROCEDURE — 99999 PR PBB SHADOW E&M-EST. PATIENT-LVL III: CPT | Mod: PBBFAC,TXP,, | Performed by: INTERNAL MEDICINE

## 2019-04-29 PROCEDURE — 99215 PR OFFICE/OUTPT VISIT, EST, LEVL V, 40-54 MIN: ICD-10-PCS | Mod: S$PBB,TXP,, | Performed by: INTERNAL MEDICINE

## 2019-04-29 PROCEDURE — 99999 PR PBB SHADOW E&M-EST. PATIENT-LVL III: ICD-10-PCS | Mod: PBBFAC,TXP,, | Performed by: INTERNAL MEDICINE

## 2019-04-29 PROCEDURE — A9555 PR RB82 RUBIDIUM: ICD-10-PCS | Mod: NTX,S$GLB,, | Performed by: INTERNAL MEDICINE

## 2019-04-29 PROCEDURE — 99215 OFFICE O/P EST HI 40 MIN: CPT | Mod: S$PBB,TXP,, | Performed by: INTERNAL MEDICINE

## 2019-04-29 PROCEDURE — 99999 PR PBB SHADOW E&M-EST. PATIENT-LVL I: CPT | Mod: PBBFAC,TXP,,

## 2019-04-29 PROCEDURE — 93015 PR CARDIAC STRESS TST,COMPLETE: ICD-10-PCS | Mod: NTX,S$GLB,, | Performed by: INTERNAL MEDICINE

## 2019-04-29 RX ORDER — DIPYRIDAMOLE 5 MG/ML
52.44 INJECTION INTRAVENOUS
Status: COMPLETED | OUTPATIENT
Start: 2019-04-29 | End: 2019-04-29

## 2019-04-29 RX ADMIN — DIPYRIDAMOLE 52.45 MG: 5 INJECTION INTRAVENOUS at 10:04

## 2019-04-29 NOTE — PROGRESS NOTES
Subjective:   Initial evaluation of heart transplant candidacy.    HPI:  Mr. Collado is a 53 y.o. year old Black or  male who has presents to be considered for advanced surgical options (LVAD/OHT).    53 year old male with history of HTN s/p AMI (chest pain) LAD stent in June 2018. He did well but was hospitalized with fluid overload twice and recently in February 2019. Now he is on enrtesto that was added to medical therapy including aldactone. See notes below (although I have not seen it there is an echo with 20% EF). S/p ICD     Since last visit he is doing well VALDES FC II-III NYHA  PET Scan preliminary no ischemia On entresto     Six minute 1200 feet    At this moment he is FC II NYHA and no PND or orthopnea. No fluid retention in lower extremities    Feels better on entresto less shortness of breath more stamina    Preliminary CPX    Peak VO2 12.7 cc/kg/min Ve Vco2 28 2018   o The left ventricle is mildly dilated.   o Ejection Fraction = 50-55%.   o The left ventricular ejection fraction is grossly normal.   o There is mild concentric left ventricular hypertrophy.   o There is mild anterior wall hypokinesis.        - Coronary angiography data  Dominance :co-dominant system.   Left main: Large caliber vessel, divides into the left anterior   descending and left circumflex. LM is Non-obstructive   Left anterior descending: Large caliber vessel giving rise to 3   major diagonals. Mid LAD 99% stenosis. Other wise LAD and   diagonals are Non-obstructive   Left circumflex:  Medium  caliber vessel giving rise to 2 major   obtuse marginals. LCX  And OM are Non-obstructive   Right coronary artery: Small caliber vessel. Distal RCA .   Distal RCA fills via collaterals from right to right.     - Left Heart Catheterization data  Aortic Pressure: 98/76 mmHg  LVEDP 31 mmHg  LV gram 55-60 %  Ao-LV gradients: none.     - IVUS data  Pre PCI IVUS data:  Proximal reference luminal diameter:  3.5  mm  Distal reference luminal diameter: 3.5 mm     Percutaneous Coronary Intervention  Successful PCI of Mid left anterior descending  99 % stenosis was   reduced to 0 % by pre dilatation with 2.5 x 15 mm balloon   followed by placement of 3.5 x 24 mm Promus Premier SENAIT. post   dilated with 3.5 x 20 mm balloon with excellent angiographic   results. IVUS guided.     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  - Succesful PCI of Mid left anterior descending artery with one   SENAIT   - Distal RCA  (small vessel)  - Increased left ventricular end diastolic pressure.    Clinic visit January 2019    This is a 53-year-old male who recently underwent LAD stenting for ST elevation MI, ischemic cardiomyopathy with EF of 35-40% post STEMI, hospital course complicated by paroxysmal atrial fibrillation and cardiogenic shock. his most recent EF is 20-25%. He is status post ICD placement for primary prevention.           Past Medical History:   Diagnosis Date    Acute hypoxemic respiratory failure     Acute kidney injury     Aneurysm     CHF (congestive heart failure)     Coronary artery disease     Diabetic amyotrophy associated with type 2 diabetes mellitus     Edema due to congestive heart failure     Fever     Heart failure     High blood pressure     History of respiratory failure     Hypertension     ICD (implantable cardioverter-defibrillator) in place     Inflammation of lung     Irregular heartbeat     SOB (shortness of breath)      Past Surgical History:   Procedure Laterality Date    atrial difibrillator  11/26/2018       Family History   Problem Relation Age of Onset    Hypertension Mother        Review of Systems   Constitution: Negative. Negative for chills, decreased appetite, diaphoresis, fever, malaise/fatigue, night sweats, weight gain and weight loss.   HENT: Negative.    Eyes: Negative.    Cardiovascular: Negative for  "chest pain, claudication, cyanosis, dyspnea on exertion, irregular heartbeat, leg swelling, near-syncope, orthopnea and palpitations.   Respiratory: Negative.  Negative for cough, hemoptysis, shortness of breath, sleep disturbances due to breathing, snoring, sputum production and wheezing.    Endocrine: Negative.    Hematologic/Lymphatic: Negative.    Skin: Negative.    Musculoskeletal: Negative.    Gastrointestinal: Negative.  Negative for abdominal pain and diarrhea.   Genitourinary: Negative.    Neurological: Negative.  Negative for weakness.   Psychiatric/Behavioral: Negative.        Objective:   Blood pressure 129/85, pulse 60, height 6' 5" (1.956 m), weight 97.6 kg (215 lb 2.7 oz).body mass index is 25.52 kg/m².    Physical Exam   Constitutional: He is oriented to person, place, and time. He appears well-developed and well-nourished.   HENT:   Head: Normocephalic and atraumatic.   Eyes: Pupils are equal, round, and reactive to light. Conjunctivae and EOM are normal.   Neck: Normal range of motion. Neck supple. JVD present.   Cardiovascular: Normal rate and regular rhythm. PMI is displaced. Exam reveals no gallop and no friction rub.   No murmur heard.  Pulmonary/Chest: Breath sounds normal. No respiratory distress. He has no wheezes. He has no rales. He exhibits no tenderness.   Abdominal: Soft. Bowel sounds are normal. He exhibits no distension and no mass. There is no hepatosplenomegaly, splenomegaly or hepatomegaly. There is no tenderness. There is no rebound, no guarding and no CVA tenderness.   No hepatoslenomegaly   Musculoskeletal: Normal range of motion. He exhibits no edema or tenderness.   Neurological: He is alert and oriented to person, place, and time. He has normal reflexes. No cranial nerve deficit. He exhibits normal muscle tone. Coordination normal.   Skin: Skin is warm and dry.   Psychiatric: He has a normal mood and affect.       Labs:      Chemistry        Component Value Date/Time    NA " 137 03/01/2019 0813    K 4.8 03/01/2019 0813     03/01/2019 0813    CO2 29 03/01/2019 0813    BUN 19 03/01/2019 0813    CREATININE 1.2 03/01/2019 0813     (H) 03/01/2019 0813        Component Value Date/Time    CALCIUM 9.7 03/01/2019 0813    ALKPHOS 288 (H) 03/01/2019 0813    AST 50 (H) 03/01/2019 0813    ALT 46 (H) 03/01/2019 0813    BILITOT 1.0 03/01/2019 0813    ESTGFRAFRICA >60.0 03/01/2019 0813    EGFRNONAA >60.0 03/01/2019 0813          No results found for: MG  Lab Results   Component Value Date    WBC 4.77 03/01/2019    HGB 13.2 (L) 03/01/2019    HCT 43.6 03/01/2019    MCV 83 03/01/2019     03/01/2019     BNP   Date Value Ref Range Status   03/01/2019 563 (H) 0 - 99 pg/mL Final     Comment:     Values of less than 100 pg/ml are consistent with non-CHF populations.     No results found for this or any previous visit.    Labs were reviewed with the patient.    Assessment:      1. Chronic combined systolic and diastolic congestive heart failure    2. Coronary artery disease involving native coronary artery of native heart without angina pectoris    3. ICD (implantable cardioverter-defibrillator) in place    4. Left ventricular aneurysm    5. Mixed hyperlipidemia    6. Paroxysmal atrial fibrillation        Plan:     HFrEF on entresto full dose 97/103 improving     Repeat CPX in 6 weeks    RTC 6 weeks      Patient is now NYHA II ACC stage D  Recommend 2 gram sodium restriction and 1500cc fluid restriction.  Encourage physical activity with graded exercise program.  Requested patient to weigh themselves daily, and to notify us if their weight increases by more than 3 lbs in 1 day or 5 lbs in 1 week.     Transplant Candidacy: Patient is a 53 y.o. year old male with heart failure is being seen for possible LVAD and OHT. In my opinion, he is  an excellent LVAD and OHT candidate. Patient did  meet with MCS and/or pre-transplant coordinator at the end of this visit for workup. he is  scheduled for  risk stratification testing with CPX/RHC. The patient will follow up with pre-transplant.    UNOS Patient Status  Functional Status: 80% - Normal activity with effort: some symptoms of disease  Physical Capacity: Limited Mobility  Working for Income: No  If no, reason not working: Disability    Rufino Real MD

## 2019-04-29 NOTE — PROGRESS NOTES
Study Title: FIX-HF_5CA: Continued Access Protocol for the Evaluation of the OPTIMIZER Smart System in Subjects with Moderate-to-Severe Heart Failure with Ejection Fraction between 25% and 45%  Protocol Version: ID CP [id number]  IRB #/Approval Date: [id number] / August 1, 2017  Sponsor: [employer]  : Dr. Edil Wheeler  Present Today: Kim Davis and Janell Lora    Approached the patient with permission from DR. Real.  Discussed the Optimizer CCM trial with patient. Patient chose to decline participation.

## 2019-04-29 NOTE — PROGRESS NOTES
Met with patient and spouse with Dr. Real during clinic visit today.  Per. Dr. Real, will plan to see patient again in 6-8 weeks for f/u with CPX to see how patient is doing on full dose Entresto. If worsening functioning, will plan to proceed with full AHF eval at that time.  Advised to hold off on any further work up for now.  Pt and spouse agreed with plan.

## 2019-04-29 NOTE — PROGRESS NOTES
Pt was approached with approval of Dr. Real for the [employer] Optimizer Research trial. Pt declined to participate in study.  Present: [facility] and pt's wife

## 2019-04-29 NOTE — LETTER
April 29, 2019        José Antonio Iraheta  [address] [address]  Phone: [phone]  Fax: [phone]             [facility] [address]  [place] [address]  Phone: [phone]   Patient: Jose Collado   MR Number: [id number]   YOB: 1965   Date of Visit: 4/29/2019       Dear Dr. José Antonio Iraheta    Thank you for referring Jose Collado to me for evaluation. Attached you will find relevant portions of my assessment and plan of care.    If you have questions, please do not hesitate to call me. I look forward to following Jose Collado along with you.    Sincerely,    Rufino Real MD    Enclosure    If you would like to receive this communication electronically, please contact [email or web address] or [phone] to request [employer] Link access.    [employer] Link is a tool which provides read-only access to select patient information with whom you have a relationship. Its easy to use and provides real time access to review your patients record including encounter summaries, notes, results, and demographic information.    If you feel you have received this communication in error or would no longer like to receive these types of communications, please e-mail [email or web address]

## 2019-06-11 ENCOUNTER — HOSPITAL ENCOUNTER (OUTPATIENT)
Dept: CARDIOLOGY | Facility: CLINIC | Age: 54
Discharge: HOME | End: 2019-06-11
Attending: INTERNAL MEDICINE
Payer: COMMERCIAL

## 2019-06-11 ENCOUNTER — OFFICE VISIT (OUTPATIENT)
Dept: TRANSPLANT | Facility: CLINIC | Age: 54
End: 2019-06-11
Payer: COMMERCIAL

## 2019-06-11 VITALS
HEIGHT: 77 IN | WEIGHT: 223 LBS | BODY MASS INDEX: 26.33 KG/M2 | HEART RATE: 60 BPM | WEIGHT: 223.13 LBS | BODY MASS INDEX: 25.82 KG/M2 | HEART RATE: 60 BPM | DIASTOLIC BLOOD PRESSURE: 82 MMHG | SYSTOLIC BLOOD PRESSURE: 136 MMHG | HEIGHT: 78 IN | SYSTOLIC BLOOD PRESSURE: 126 MMHG | DIASTOLIC BLOOD PRESSURE: 98 MMHG

## 2019-06-11 DIAGNOSIS — Z95.810 ICD (IMPLANTABLE CARDIOVERTER-DEFIBRILLATOR) IN PLACE: ICD-10-CM

## 2019-06-11 DIAGNOSIS — E78.2 MIXED HYPERLIPIDEMIA: ICD-10-CM

## 2019-06-11 DIAGNOSIS — I48.0 PAROXYSMAL ATRIAL FIBRILLATION: ICD-10-CM

## 2019-06-11 DIAGNOSIS — I50.42 CHRONIC COMBINED SYSTOLIC AND DIASTOLIC CONGESTIVE HEART FAILURE: Primary | ICD-10-CM

## 2019-06-11 DIAGNOSIS — I25.3 LEFT VENTRICULAR ANEURYSM: ICD-10-CM

## 2019-06-11 DIAGNOSIS — I50.42 CHRONIC COMBINED SYSTOLIC AND DIASTOLIC CONGESTIVE HEART FAILURE: ICD-10-CM

## 2019-06-11 DIAGNOSIS — I25.10 CORONARY ARTERY DISEASE INVOLVING NATIVE CORONARY ARTERY OF NATIVE HEART WITHOUT ANGINA PECTORIS: ICD-10-CM

## 2019-06-11 LAB
CV STRESS BASE HR: 60 BPM
DIASTOLIC BLOOD PRESSURE: 98 MMHG
OHS CV CPX 1 MINUTE RECOVERY HEART RATE: 74 BPM
OHS CV CPX 85 PERCENT MAX PREDICTED HEART RATE MALE: 143
OHS CV CPX ANAEROBIC THRESHOLD DIASTOLIC BLOOD PRESSURE: 79 MMHG
OHS CV CPX ANAEROBIC THRESHOLD HEART RATE: 78
OHS CV CPX ANAEROBIC THRESHOLD RATE PRESSURE PRODUCT: 8970
OHS CV CPX ANAEROBIC THRESHOLD SYSTOLIC BLOOD PRESSURE: 115
OHS CV CPX DATA GRADE - AT: 2.5
OHS CV CPX DATA GRADE - PEAK: 4.3
OHS CV CPX DATA O2 SAT - REST: 96
OHS CV CPX DATA SPEED - AT: 2.3
OHS CV CPX DATA SPEED - PEAK: 2.8
OHS CV CPX DATA TIME - AT: 4.13
OHS CV CPX DATA TIME - PEAK: 6.28
OHS CV CPX DATA VE/VCO2 - AT: 32
OHS CV CPX DATA VE/VCO2 - PEAK: 32
OHS CV CPX DATA VE/VO2 - AT: 28
OHS CV CPX DATA VE/VO2 - PEAK: 31
OHS CV CPX DATA VO2 - AT: 11.9
OHS CV CPX DATA VO2 - PEAK: 13.4
OHS CV CPX DATA VO2 - REST: 4
OHS CV CPX FEV1/FVC: 0.61
OHS CV CPX FORCED EXPIRATORY VOLUME: 2.19
OHS CV CPX FORCED VITAL CAPACITY (FVC): 3.61
OHS CV CPX HIGHEST VO: 32.5
OHS CV CPX MAX PREDICTED HEART RATE: 168
OHS CV CPX MAXIMAL VOLUNTARY VENTILATION (MVV) PREDICTED: 87.6
OHS CV CPX MAXIMAL VOLUNTARY VENTILATION (MVV): 55
OHS CV CPX MAXIUMUM EXERCISE VENTILATION (VE MAX): 39.6
OHS CV CPX PATIENT AGE: 52
OHS CV CPX PATIENT HEIGHT IN: 77
OHS CV CPX PATIENT IS FEMALE AGE 11-19: 0
OHS CV CPX PATIENT IS FEMALE AGE GREATER THAN 19: 0
OHS CV CPX PATIENT IS FEMALE AGE LESS THAN 11: 0
OHS CV CPX PATIENT IS FEMALE: 0
OHS CV CPX PATIENT IS MALE AGE 11-25: 0
OHS CV CPX PATIENT IS MALE AGE GREATER THAN 25: 1
OHS CV CPX PATIENT IS MALE AGE LESS THAN 11: 0
OHS CV CPX PATIENT IS MALE GREATER THAN 18: 1
OHS CV CPX PATIENT IS MALE LESS THAN OR EQUAL TO 18: 0
OHS CV CPX PATIENT IS MALE: 1
OHS CV CPX PATIENT WEIGHT RETURNED IN OZ: 3568
OHS CV CPX PEAK DIASTOLIC BLOOD PRESSURE: 83 MMHG
OHS CV CPX PEAK HEAR RATE: 89 BPM
OHS CV CPX PEAK RATE PRESSURE PRODUCT: NORMAL
OHS CV CPX PEAK SYSTOLIC BLOOD PRESSURE: 120 MMHG
OHS CV CPX PERCENT BODY FAT: 18.7
OHS CV CPX PERCENT MAX PREDICTED HEART RATE ACHIEVED: 53
OHS CV CPX PREDICTED VO2: 32.5 ML/KG/MIN
OHS CV CPX RATE PRESSURE PRODUCT PRESENTING: 8160
OHS CV CPX REST PET CO2: 33
OHS CV CPX VE/VCO2 SLOPE: 26.1
STRESS ECHO POST EXERCISE DUR MIN: 6 MINUTES
STRESS ECHO POST EXERCISE DUR SEC: 17 SECONDS
SYSTOLIC BLOOD PRESSURE: 136 MMHG

## 2019-06-11 PROCEDURE — 3008F BODY MASS INDEX DOCD: CPT | Mod: CPTII,S$GLB,TXP, | Performed by: INTERNAL MEDICINE

## 2019-06-11 PROCEDURE — 94621 CARDIOPULM EXERCISE TESTING: CPT | Mod: S$GLB,TXP,, | Performed by: INTERNAL MEDICINE

## 2019-06-11 PROCEDURE — 99215 PR OFFICE/OUTPT VISIT, EST, LEVL V, 40-54 MIN: ICD-10-PCS | Mod: S$GLB,TXP,, | Performed by: INTERNAL MEDICINE

## 2019-06-11 PROCEDURE — 99999 PR PBB SHADOW E&M-EST. PATIENT-LVL III: ICD-10-PCS | Mod: PBBFAC,TXP,, | Performed by: INTERNAL MEDICINE

## 2019-06-11 PROCEDURE — 94621 CARDIOPULMONARY EXERCISE TESTING (CUPID ONLY): ICD-10-PCS | Mod: S$GLB,TXP,, | Performed by: INTERNAL MEDICINE

## 2019-06-11 PROCEDURE — 3008F PR BODY MASS INDEX (BMI) DOCUMENTED: ICD-10-PCS | Mod: CPTII,S$GLB,TXP, | Performed by: INTERNAL MEDICINE

## 2019-06-11 PROCEDURE — 99215 OFFICE O/P EST HI 40 MIN: CPT | Mod: S$GLB,TXP,, | Performed by: INTERNAL MEDICINE

## 2019-06-11 PROCEDURE — 99999 PR PBB SHADOW E&M-EST. PATIENT-LVL III: CPT | Mod: PBBFAC,TXP,, | Performed by: INTERNAL MEDICINE

## 2019-06-11 RX ORDER — PANTOPRAZOLE SODIUM 40 MG/1
40 TABLET, DELAYED RELEASE ORAL DAILY
COMMUNITY
Start: 2019-05-30 | End: 2019-12-17 | Stop reason: SDUPTHER

## 2019-06-11 NOTE — PROGRESS NOTES
Subjective:   Initial evaluation of heart transplant candidacy.    HPI:  Mr. Collado is a 53 y.o. year old Black or  male who has presents to be considered for advanced surgical options (LVAD/OHT).    53 year old male with history of HTN s/p AMI (chest pain) LAD stent in June 2018. He did well but was hospitalized with fluid overload twice and recently in February 2019. Now he is on enrtesto that was added to medical therapy including aldactone. See notes below (although I have not seen it there is an echo with 20% EF). S/p ICD     Since last visit he is doing well VALDES FC II-III NYHA On entresto     Six minute 1200 feet    At this moment he is FC II NYHA and no PND or orthopnea. No fluid retention in lower extremities    Feels better on entresto less shortness of breath more stamina    Preliminary CPX    Peak VO2 12.7 cc/kg/min Ve Vco2 28 2018   o The left ventricle is mildly dilated.   o Ejection Fraction = 50-55%.   o The left ventricular ejection fraction is grossly normal.   o There is mild concentric left ventricular hypertrophy.   o There is mild anterior wall hypokinesis.        - Coronary angiography data  Dominance :co-dominant system.   Left main: Large caliber vessel, divides into the left anterior   descending and left circumflex. LM is Non-obstructive   Left anterior descending: Large caliber vessel giving rise to 3   major diagonals. Mid LAD 99% stenosis. Other wise LAD and   diagonals are Non-obstructive   Left circumflex:  Medium  caliber vessel giving rise to 2 major   obtuse marginals. LCX  And OM are Non-obstructive   Right coronary artery: Small caliber vessel. Distal RCA .   Distal RCA fills via collaterals from right to right.     - Left Heart Catheterization data  Aortic Pressure: 98/76 mmHg  LVEDP 31 mmHg  LV gram 55-60 %  Ao-LV gradients: none.     - IVUS data  Pre PCI IVUS data:  Proximal reference luminal diameter:  3.5 mm  Distal reference luminal diameter:  3.5 mm     Percutaneous Coronary Intervention  Successful PCI of Mid left anterior descending  99 % stenosis was   reduced to 0 % by pre dilatation with 2.5 x 15 mm balloon   followed by placement of 3.5 x 24 mm Promus Premier SENAIT. post   dilated with 3.5 x 20 mm balloon with excellent angiographic   results. IVUS guided.     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  - Succesful PCI of Mid left anterior descending artery with one   SENAIT   - Distal RCA  (small vessel)  - Increased left ventricular end diastolic pressure.    Clinic visit January 2019    This is a 53-year-old male who recently underwent LAD stenting for ST elevation MI, ischemic cardiomyopathy with EF of 35-40% post STEMI, hospital course complicated by paroxysmal atrial fibrillation and cardiogenic shock. his most recent EF is 20-25%. He is status post ICD placement for primary prevention.     There is a large sized, moderate to severe intensity basal to apical anterior and septal fixed perfusion abnormality involving 45% of the LV myocardium in the distribution of the proximal LAD territory consistent with non-transmural infarct.  · Whole heart absolute myocardial perfusion (cc/min/g) averaged 0.56 at rest (which is reduced due to the infarct), 0.99 at stress (which is moderately reduced), and 1.7 CFR (which is mildly reduced partially due to the infarct).  · Outside of the above defect, coronary flow capacity is mildly reduced globally.  · Gated perfusion images showed an ejection fraction of 38 % at rest and 41 % during stress. Normal is >51%.  · There is hypokinesis of the septal and anterior walls at rest and stress.  · LV cavity size is mildly enlarged at rest and mildly enlarged at stress.  · The EKG portion of this study is negative for myocardial ischemia.  · The patient reported headache during the stress test.  · Arrhythmias during stress: occasional  "PVCs.      Since last visit continue to do well (has tolerated high dose on entresto. FC II NYHA    Past Medical History:   Diagnosis Date    Acute hypoxemic respiratory failure     Acute kidney injury     Aneurysm     CHF (congestive heart failure)     Coronary artery disease     Diabetic amyotrophy associated with type 2 diabetes mellitus     Edema due to congestive heart failure     Fever     Heart failure     High blood pressure     History of respiratory failure     Hypertension     ICD (implantable cardioverter-defibrillator) in place     Inflammation of lung     Irregular heartbeat     SOB (shortness of breath)      Past Surgical History:   Procedure Laterality Date    atrial difibrillator  11/26/2018       Family History   Problem Relation Age of Onset    Hypertension Mother        Review of Systems   Constitution: Negative. Negative for chills, decreased appetite, diaphoresis, fever, malaise/fatigue, night sweats, weight gain and weight loss.   HENT: Negative.    Eyes: Negative.    Cardiovascular: Negative for chest pain, claudication, cyanosis, dyspnea on exertion, irregular heartbeat, leg swelling, near-syncope, orthopnea and palpitations.   Respiratory: Negative.  Negative for cough, hemoptysis, shortness of breath, sleep disturbances due to breathing, snoring, sputum production and wheezing.    Endocrine: Negative.    Hematologic/Lymphatic: Negative.    Skin: Negative.    Musculoskeletal: Negative.    Gastrointestinal: Negative.  Negative for abdominal pain and diarrhea.   Genitourinary: Negative.    Neurological: Negative.  Negative for weakness.   Psychiatric/Behavioral: Negative.        Objective:   Blood pressure 126/82, pulse 60, height 6' 5.5" (1.969 m), weight 101.2 kg (223 lb 1.7 oz).body mass index is 26.12 kg/m².    Physical Exam   Constitutional: He is oriented to person, place, and time. He appears well-developed and well-nourished.   HENT:   Head: Normocephalic and " atraumatic.   Eyes: Pupils are equal, round, and reactive to light. Conjunctivae and EOM are normal.   Neck: Normal range of motion. Neck supple. JVD present.   Cardiovascular: Normal rate and regular rhythm. PMI is displaced. Exam reveals no gallop and no friction rub.   No murmur heard.  Pulmonary/Chest: Breath sounds normal. No respiratory distress. He has no wheezes. He has no rales. He exhibits no tenderness.   Abdominal: Soft. Bowel sounds are normal. He exhibits no distension and no mass. There is no hepatosplenomegaly, splenomegaly or hepatomegaly. There is no tenderness. There is no rebound, no guarding and no CVA tenderness.   No hepatoslenomegaly   Musculoskeletal: Normal range of motion. He exhibits no edema or tenderness.   Neurological: He is alert and oriented to person, place, and time. He has normal reflexes. No cranial nerve deficit. He exhibits normal muscle tone. Coordination normal.   Skin: Skin is warm and dry.   Psychiatric: He has a normal mood and affect.       Labs:      Chemistry        Component Value Date/Time     03/01/2019 0813    K 4.8 03/01/2019 0813     03/01/2019 0813    CO2 29 03/01/2019 0813    BUN 19 03/01/2019 0813    CREATININE 1.2 03/01/2019 0813     (H) 03/01/2019 0813        Component Value Date/Time    CALCIUM 9.7 03/01/2019 0813    ALKPHOS 288 (H) 03/01/2019 0813    AST 50 (H) 03/01/2019 0813    ALT 46 (H) 03/01/2019 0813    BILITOT 1.0 03/01/2019 0813    ESTGFRAFRICA >60.0 03/01/2019 0813    EGFRNONAA >60.0 03/01/2019 0813          No results found for: MG  Lab Results   Component Value Date    WBC 4.77 03/01/2019    HGB 13.2 (L) 03/01/2019    HCT 43.6 03/01/2019    MCV 83 03/01/2019     03/01/2019     BNP   Date Value Ref Range Status   03/01/2019 563 (H) 0 - 99 pg/mL Final     Comment:     Values of less than 100 pg/ml are consistent with non-CHF populations.     No results found for this or any previous visit.    Labs were reviewed with the  patient.    Assessment:      1. Chronic combined systolic and diastolic congestive heart failure    2. Coronary artery disease involving native coronary artery of native heart without angina pectoris    3. ICD (implantable cardioverter-defibrillator) in place    4. Left ventricular aneurysm    5. Mixed hyperlipidemia    6. Paroxysmal atrial fibrillation        Plan:     HFrEF on entresto full dose 97/103 improving clinically now CPX preliminary peak VO 13.2 cc/kg/min     RTC 3 month    Patient is now NYHA II ACC stage D  Recommend 2 gram sodium restriction and 1500cc fluid restriction.  Encourage physical activity with graded exercise program.  Requested patient to weigh themselves daily, and to notify us if their weight increases by more than 3 lbs in 1 day or 5 lbs in 1 week.     Transplant Candidacy: Patient is a 53 y.o. year old male with heart failure is being seen for possible LVAD and OHT. In my opinion, he is  an excellent LVAD and OHT candidate. Patient did  meet with MCS and/or pre-transplant coordinator at the end of this visit for workup. he is  scheduled for risk stratification testing with CPX/RHC. The patient will follow up with pre-transplant.    UNOS Patient Status  Functional Status: 80% - Normal activity with effort: some symptoms of disease  Physical Capacity: Limited Mobility  Working for Income: No  If no, reason not working: Disability    Rufino Real MD

## 2019-06-11 NOTE — LETTER
June 11, 2019        José Antonio Iraheta  [address] [address]  Phone: [phone]  Fax: [phone]             [facility] [address]  [facility] [address]  Phone: [phone]   Patient: Jose Collado   MR Number: [id number]   YOB: 1965   Date of Visit: 6/11/2019       Dear Dr. José Antonio Iraheta    Thank you for referring Jose Collado to me for evaluation. Attached you will find relevant portions of my assessment and plan of care.    If you have questions, please do not hesitate to call me. I look forward to following Jose Collado along with you.    Sincerely,    Rufino Real MD    Enclosure    If you would like to receive this communication electronically, please contact [email or web address] or [phone] to request [employer] Link access.    [employer] Link is a tool which provides read-only access to select patient information with whom you have a relationship. Its easy to use and provides real time access to review your patients record including encounter summaries, notes, results, and demographic information.    If you feel you have received this communication in error or would no longer like to receive these types of communications, please e-mail [email or web address]

## 2019-06-18 ENCOUNTER — TELEPHONE (OUTPATIENT)
Dept: PHARMACY | Facility: CLINIC | Age: 54
End: 2019-06-18

## 2019-06-27 DIAGNOSIS — I50.22 CHRONIC SYSTOLIC CONGESTIVE HEART FAILURE: Primary | ICD-10-CM

## 2019-08-13 ENCOUNTER — TELEPHONE (OUTPATIENT)
Dept: PHARMACY | Facility: CLINIC | Age: 54
End: 2019-08-13

## 2019-09-09 ENCOUNTER — OFFICE VISIT (OUTPATIENT)
Dept: TRANSPLANT | Facility: CLINIC | Age: 54
End: 2019-09-09
Payer: MEDICAID

## 2019-09-09 ENCOUNTER — LAB VISIT (OUTPATIENT)
Dept: LAB | Facility: HOSPITAL | Age: 54
End: 2019-09-09
Attending: INTERNAL MEDICINE
Payer: MEDICAID

## 2019-09-09 VITALS
HEIGHT: 77 IN | BODY MASS INDEX: 27.41 KG/M2 | SYSTOLIC BLOOD PRESSURE: 131 MMHG | DIASTOLIC BLOOD PRESSURE: 86 MMHG | WEIGHT: 232.13 LBS | HEART RATE: 60 BPM

## 2019-09-09 DIAGNOSIS — I50.22 CHRONIC SYSTOLIC CONGESTIVE HEART FAILURE: ICD-10-CM

## 2019-09-09 DIAGNOSIS — I50.42 CHRONIC COMBINED SYSTOLIC AND DIASTOLIC CONGESTIVE HEART FAILURE: Primary | ICD-10-CM

## 2019-09-09 DIAGNOSIS — I25.3 LEFT VENTRICULAR ANEURYSM: ICD-10-CM

## 2019-09-09 DIAGNOSIS — E78.2 MIXED HYPERLIPIDEMIA: ICD-10-CM

## 2019-09-09 DIAGNOSIS — I25.10 CORONARY ARTERY DISEASE INVOLVING NATIVE CORONARY ARTERY OF NATIVE HEART WITHOUT ANGINA PECTORIS: ICD-10-CM

## 2019-09-09 DIAGNOSIS — I48.0 PAROXYSMAL ATRIAL FIBRILLATION: ICD-10-CM

## 2019-09-09 DIAGNOSIS — Z95.810 ICD (IMPLANTABLE CARDIOVERTER-DEFIBRILLATOR) IN PLACE: ICD-10-CM

## 2019-09-09 LAB
ALBUMIN SERPL BCP-MCNC: 3.8 G/DL (ref 3.5–5.2)
ALP SERPL-CCNC: 132 U/L (ref 55–135)
ALT SERPL W/O P-5'-P-CCNC: 24 U/L (ref 10–44)
ANION GAP SERPL CALC-SCNC: 9 MMOL/L (ref 8–16)
AST SERPL-CCNC: 26 U/L (ref 10–40)
BILIRUB SERPL-MCNC: 1.4 MG/DL (ref 0.1–1)
BUN SERPL-MCNC: 15 MG/DL (ref 6–20)
CALCIUM SERPL-MCNC: 9 MG/DL (ref 8.7–10.5)
CHLORIDE SERPL-SCNC: 105 MMOL/L (ref 95–110)
CO2 SERPL-SCNC: 30 MMOL/L (ref 23–29)
CREAT SERPL-MCNC: 1 MG/DL (ref 0.5–1.4)
EST. GFR  (AFRICAN AMERICAN): >60 ML/MIN/1.73 M^2
EST. GFR  (NON AFRICAN AMERICAN): >60 ML/MIN/1.73 M^2
GLUCOSE SERPL-MCNC: 129 MG/DL (ref 70–110)
POTASSIUM SERPL-SCNC: 3.8 MMOL/L (ref 3.5–5.1)
PROT SERPL-MCNC: 7.2 G/DL (ref 6–8.4)
SODIUM SERPL-SCNC: 144 MMOL/L (ref 136–145)

## 2019-09-09 PROCEDURE — 99215 OFFICE O/P EST HI 40 MIN: CPT | Mod: S$PBB,TXP,, | Performed by: INTERNAL MEDICINE

## 2019-09-09 PROCEDURE — 99213 OFFICE O/P EST LOW 20 MIN: CPT | Mod: PBBFAC,NTX | Performed by: INTERNAL MEDICINE

## 2019-09-09 PROCEDURE — 99215 PR OFFICE/OUTPT VISIT, EST, LEVL V, 40-54 MIN: ICD-10-PCS | Mod: S$PBB,TXP,, | Performed by: INTERNAL MEDICINE

## 2019-09-09 PROCEDURE — 99999 PR PBB SHADOW E&M-EST. PATIENT-LVL III: CPT | Mod: PBBFAC,TXP,, | Performed by: INTERNAL MEDICINE

## 2019-09-09 PROCEDURE — 99999 PR PBB SHADOW E&M-EST. PATIENT-LVL III: ICD-10-PCS | Mod: PBBFAC,TXP,, | Performed by: INTERNAL MEDICINE

## 2019-09-09 PROCEDURE — 36415 COLL VENOUS BLD VENIPUNCTURE: CPT | Mod: NTX

## 2019-09-09 PROCEDURE — 80053 COMPREHEN METABOLIC PANEL: CPT | Mod: NTX

## 2019-09-09 NOTE — PROGRESS NOTES
Subjective:   Initial evaluation of heart transplant candidacy.    HPI:  Mr. Collado is a 53 y.o. year old Black or  male who has presents to be considered for advanced surgical options (LVAD/OHT).    53 year old male with history of HTN s/p AMI (chest pain) LAD stent in June 2018. He did well but was hospitalized with fluid overload twice and recently in February 2019. Now he is on enrtesto that was added to medical therapy including aldactone. See notes below (although I have not seen it there is an echo with 20% EF). S/p ICD     Since last visit he is doing well VALDES FC II NYHA On entresto .      Six minute 1200 feet    At this moment he is FC II NYHA and no PND or orthopnea. No fluid retention in lower extremities    Feels better on entresto less shortness of breath more stamina    Resting spirometry reveals an FVC = 3.61L which is 58.56% of predicted, an FEV1 of 2.19L, which is 43.95% of predicted and an FEV1/FVC ratio of 60.66%. The MVV = 87.6 L/min, which is 50.01% of predicted.     The respiratory exchange ratio (RER) was 1, suggesting suboptimal effort.     The breathing reserve is calculated at 54.79%, which is normal.     The peak VO2 was 13.4 ml/kg/min which is 41.23% of predicted equating to a functional capacity of 3.83 METS indicating severe functional impairment.     The anaerobic threshold (AT), which occurred at a heart rate of 78bpm, was 11.9 ml/kg/min, which is 36.62% of the predicted VO2 and is reduced.     The peak VO2 Lean was 16.48 ml/kg of lean body weight/min indicating a poor prognosis in heart failure.     The VE/VCO2 [place] was 26.1. The Resting PetCO2 was 33.0.     Severe functional impairment associated with a normal breathing reserve, suboptimal effort, and a reduced AT. These findings are indicative of functional impairment secondary to circulatory insufficiency.      2018   o The left ventricle is mildly dilated.   o Ejection Fraction = 50-55%.   o The left  ventricular ejection fraction is grossly normal.   o There is mild concentric left ventricular hypertrophy.   o There is mild anterior wall hypokinesis.        - Coronary angiography data  Dominance :co-dominant system.   Left main: Large caliber vessel, divides into the left anterior   descending and left circumflex. LM is Non-obstructive   Left anterior descending: Large caliber vessel giving rise to 3   major diagonals. Mid LAD 99% stenosis. Other wise LAD and   diagonals are Non-obstructive   Left circumflex:  Medium  caliber vessel giving rise to 2 major   obtuse marginals. LCX  And OM are Non-obstructive   Right coronary artery: Small caliber vessel. Distal RCA .   Distal RCA fills via collaterals from right to right.     - Left Heart Catheterization data  Aortic Pressure: 98/76 mmHg  LVEDP 31 mmHg  LV gram 55-60 %  Ao-LV gradients: none.     - IVUS data  Pre PCI IVUS data:  Proximal reference luminal diameter:  3.5 mm  Distal reference luminal diameter: 3.5 mm     Percutaneous Coronary Intervention  Successful PCI of Mid left anterior descending  99 % stenosis was   reduced to 0 % by pre dilatation with 2.5 x 15 mm balloon   followed by placement of 3.5 x 24 mm Promus Premier SENAIT. post   dilated with 3.5 x 20 mm balloon with excellent angiographic   results. IVUS guided.     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  - Succesful PCI of Mid left anterior descending artery with one   SENAIT   - Distal RCA  (small vessel)  - Increased left ventricular end diastolic pressure.    Clinic visit January 2019    This is a 53-year-old male who recently underwent LAD stenting for ST elevation MI, ischemic cardiomyopathy with EF of 35-40% post STEMI, hospital course complicated by paroxysmal atrial fibrillation and cardiogenic shock. his most recent EF is 20-25%. He is status post ICD placement for primary prevention.     There is a large  sized, moderate to severe intensity basal to apical anterior and septal fixed perfusion abnormality involving 45% of the LV myocardium in the distribution of the proximal LAD territory consistent with non-transmural infarct.  · Whole heart absolute myocardial perfusion (cc/min/g) averaged 0.56 at rest (which is reduced due to the infarct), 0.99 at stress (which is moderately reduced), and 1.7 CFR (which is mildly reduced partially due to the infarct).  · Outside of the above defect, coronary flow capacity is mildly reduced globally.  · Gated perfusion images showed an ejection fraction of 38 % at rest and 41 % during stress. Normal is >51%.  · There is hypokinesis of the septal and anterior walls at rest and stress.  · LV cavity size is mildly enlarged at rest and mildly enlarged at stress.  · The EKG portion of this study is negative for myocardial ischemia.  · The patient reported headache during the stress test.  · Arrhythmias during stress: occasional PVCs.      Since last visit continue to do well (has tolerated high dose on entresto. FC II NYHA    Past Medical History:   Diagnosis Date    Acute hypoxemic respiratory failure     Acute kidney injury     Aneurysm     CHF (congestive heart failure)     Coronary artery disease     Diabetic amyotrophy associated with type 2 diabetes mellitus     Edema due to congestive heart failure     Fever     Heart failure     High blood pressure     History of respiratory failure     Hypertension     ICD (implantable cardioverter-defibrillator) in place     Inflammation of lung     Irregular heartbeat     SOB (shortness of breath)      Past Surgical History:   Procedure Laterality Date    atrial difibrillator  11/26/2018       Family History   Problem Relation Age of Onset    Hypertension Mother        Review of Systems   Constitution: Negative. Negative for chills, decreased appetite, diaphoresis, fever, malaise/fatigue, night sweats, weight gain and weight  "loss.   HENT: Negative.    Eyes: Negative.    Cardiovascular: Negative for chest pain, claudication, cyanosis, dyspnea on exertion, irregular heartbeat, leg swelling, near-syncope, orthopnea and palpitations.   Respiratory: Negative.  Negative for cough, hemoptysis, shortness of breath, sleep disturbances due to breathing, snoring, sputum production and wheezing.    Endocrine: Negative.    Hematologic/Lymphatic: Negative.    Skin: Negative.    Musculoskeletal: Negative.    Gastrointestinal: Negative.  Negative for abdominal pain and diarrhea.   Genitourinary: Negative.    Neurological: Negative.  Negative for weakness.   Psychiatric/Behavioral: Negative.        Objective:   Blood pressure 131/86, pulse 60, height 6' 5" (1.956 m), weight 105.3 kg (232 lb 2.3 oz).body mass index is 27.53 kg/m².    Physical Exam   Constitutional: He is oriented to person, place, and time. He appears well-developed and well-nourished.   HENT:   Head: Normocephalic and atraumatic.   Eyes: Pupils are equal, round, and reactive to light. Conjunctivae and EOM are normal.   Neck: Normal range of motion. Neck supple. JVD present.   Cardiovascular: Normal rate and regular rhythm. PMI is displaced. Exam reveals no gallop and no friction rub.   No murmur heard.  Pulmonary/Chest: Breath sounds normal. No respiratory distress. He has no wheezes. He has no rales. He exhibits no tenderness.   Abdominal: Soft. Bowel sounds are normal. He exhibits no distension and no mass. There is no hepatosplenomegaly, splenomegaly or hepatomegaly. There is no tenderness. There is no rebound, no guarding and no CVA tenderness.   No hepatoslenomegaly   Musculoskeletal: Normal range of motion. He exhibits no edema or tenderness.   Neurological: He is alert and oriented to person, place, and time. He has normal reflexes. No cranial nerve deficit. He exhibits normal muscle tone. Coordination normal.   Skin: Skin is warm and dry.   Psychiatric: He has a normal mood and " affect.       Labs:      Chemistry        Component Value Date/Time     03/01/2019 0813    K 4.8 03/01/2019 0813     03/01/2019 0813    CO2 29 03/01/2019 0813    BUN 19 03/01/2019 0813    CREATININE 1.2 03/01/2019 0813     (H) 03/01/2019 0813        Component Value Date/Time    CALCIUM 9.7 03/01/2019 0813    ALKPHOS 288 (H) 03/01/2019 0813    AST 50 (H) 03/01/2019 0813    ALT 46 (H) 03/01/2019 0813    BILITOT 1.0 03/01/2019 0813    ESTGFRAFRICA >60.0 03/01/2019 0813    EGFRNONAA >60.0 03/01/2019 0813          No results found for: MG  Lab Results   Component Value Date    WBC 4.77 03/01/2019    HGB 13.2 (L) 03/01/2019    HCT 43.6 03/01/2019    MCV 83 03/01/2019     03/01/2019     BNP   Date Value Ref Range Status   03/01/2019 563 (H) 0 - 99 pg/mL Final     Comment:     Values of less than 100 pg/ml are consistent with non-CHF populations.     No results found for this or any previous visit.    Labs were reviewed with the patient.    Assessment:      1. Chronic combined systolic and diastolic congestive heart failure    2. Chronic systolic congestive heart failure    3. Coronary artery disease involving native coronary artery of native heart without angina pectoris    4. ICD (implantable cardioverter-defibrillator) in place    5. Left ventricular aneurysm    6. Mixed hyperlipidemia    7. Paroxysmal atrial fibrillation        Plan:     HFrEF on entresto full dose 97/103 improving clinically     Resting spirometry reveals an FVC = 3.61L which is 58.56% of predicted, an FEV1 of 2.19L, which is 43.95% of predicted and an FEV1/FVC ratio of 60.66%. The MVV = 87.6 L/min, which is 50.01% of predicted.     The respiratory exchange ratio (RER) was 1, suggesting suboptimal effort.     The breathing reserve is calculated at 54.79%, which is normal.     The peak VO2 was 13.4 ml/kg/min which is 41.23% of predicted equating to a functional capacity of 3.83 METS indicating severe functional impairment.      The anaerobic threshold (AT), which occurred at a heart rate of 78bpm, was 11.9 ml/kg/min, which is 36.62% of the predicted VO2 and is reduced.     The peak VO2 Lean was 16.48 ml/kg of lean body weight/min indicating a poor prognosis in heart failure.     The VE/VCO2 [place] was 26.1. The Resting PetCO2 was 33.0.     Severe functional impairment associated with a normal breathing reserve, suboptimal effort, and a reduced AT. These findings are indicative of functional impairment secondary to circulatory insufficiency.    RTC 3 month with 2 D echo and CPX    Patient is now NYHA II ACC stage C  Recommend 2 gram sodium restriction and 1500cc fluid restriction.  Encourage physical activity with graded exercise program.  Requested patient to weigh themselves daily, and to notify us if their weight increases by more than 3 lbs in 1 day or 5 lbs in 1 week.     Transplant Candidacy: Patient is a 53 y.o. year old male with heart failure is being seen for possible LVAD and OHT. In my opinion, he is  an excellent LVAD and OHT candidate. Patient did  meet with [facility] and/or pre-transplant coordinator at the end of this visit for workup. he is  scheduled for risk stratification testing with CPX/RHC. The patient will follow up with pre-transplant.    UNOS Patient Status  Functional Status: 80% - Normal activity with effort: some symptoms of disease  Physical Capacity: Limited Mobility  Working for Income: No  If no, reason not working: Disability    Rufino Real MD

## 2019-09-09 NOTE — LETTER
September 9, 2019        José Antonio Iraheta  [address] [address]  Phone: [phone]  Fax: [phone]             [facility] [address]  [place] [address]  Phone: [phone]   Patient: Jose Collado   MR Number: [id number]   YOB: 1965   Date of Visit: 9/9/2019       Dear Dr. José Antonio Iraheta    Thank you for referring Jose Collado to me for evaluation. Attached you will find relevant portions of my assessment and plan of care.    If you have questions, please do not hesitate to call me. I look forward to following Jose Collado along with you.    Sincerely,    Rufino Real MD    Enclosure    If you would like to receive this communication electronically, please contact [email or web address] or [phone] to request [email or web address] access.    [email or web address] is a tool which provides read-only access to select patient information with whom you have a relationship. Its easy to use and provides real time access to review your patients record including encounter summaries, notes, results, and demographic information.    If you feel you have received this communication in error or would no longer like to receive these types of communications, please e-mail [email or web address]

## 2019-09-11 ENCOUNTER — PATIENT MESSAGE (OUTPATIENT)
Dept: TRANSPLANT | Facility: CLINIC | Age: 54
End: 2019-09-11

## 2019-10-08 DIAGNOSIS — I50.42 CHRONIC COMBINED SYSTOLIC AND DIASTOLIC CONGESTIVE HEART FAILURE: Primary | ICD-10-CM

## 2019-10-08 RX ORDER — METOPROLOL SUCCINATE 200 MG/1
200 TABLET, EXTENDED RELEASE ORAL DAILY
Qty: 30 TABLET | Refills: 11 | Status: SHIPPED | OUTPATIENT
Start: 2019-10-08 | End: 2020-10-15

## 2019-10-08 NOTE — TELEPHONE ENCOUNTER
----- Message from Bridgette Nichols MA sent at 10/8/2019  9:17 AM CDT -----  Contact: [phone]-[phone] or [phone]  She  is calling about 's med Metoprolol 100 mg to chg dose to 200 mg so medicaid can cover it. Please call. Last visit 9/9/19.

## 2019-10-08 NOTE — TELEPHONE ENCOUNTER
----- Message from Susie Paul MA sent at 10/8/2019  1:38 PM CDT -----  Contact: [phone]-[phone] or [phone]  This would be a new script. Insurance would only over 200 mg of metorprolol  ----- Message -----  From: Bridgette Nichols MA  Sent: 10/8/2019   9:17 AM CDT  To: [facility]    She  is calling about 's med Metoprolol 100 mg to chg dose to 200 mg so medicaid can cover it. Please call. Last visit 9/9/19.

## 2019-10-08 NOTE — TELEPHONE ENCOUNTER
I spoke with Mrs. Collado- insurance will not pay for Toprol  bid, but will pay for 200 mg po daily.  I will ask MD if this is ok and will send updated order to pharmacy if necessary.

## 2019-11-25 ENCOUNTER — DOCUMENTATION ONLY (OUTPATIENT)
Dept: TRANSPLANT | Facility: CLINIC | Age: 54
End: 2019-11-25

## 2019-11-25 RX ORDER — SERTRALINE HYDROCHLORIDE 25 MG/1
TABLET, FILM COATED ORAL
Qty: 30 TABLET | Refills: 1 | Status: SHIPPED | OUTPATIENT
Start: 2019-11-25 | End: 2019-12-09

## 2019-11-25 RX ORDER — SERTRALINE HYDROCHLORIDE 25 MG/1
TABLET, FILM COATED ORAL
Qty: 30 TABLET | Refills: 1 | Status: SHIPPED | OUTPATIENT
Start: 2019-11-25 | End: 2020-03-13

## 2019-11-26 ENCOUNTER — TELEPHONE (OUTPATIENT)
Dept: TRANSPLANT | Facility: CLINIC | Age: 54
End: 2019-11-26

## 2019-12-09 ENCOUNTER — HOSPITAL ENCOUNTER (OUTPATIENT)
Dept: CARDIOLOGY | Facility: CLINIC | Age: 54
Discharge: HOME | End: 2019-12-09
Attending: INTERNAL MEDICINE
Payer: MEDICAID

## 2019-12-09 ENCOUNTER — OFFICE VISIT (OUTPATIENT)
Dept: TRANSPLANT | Facility: CLINIC | Age: 54
End: 2019-12-09
Payer: MEDICAID

## 2019-12-09 VITALS
BODY MASS INDEX: 27.28 KG/M2 | DIASTOLIC BLOOD PRESSURE: 84 MMHG | WEIGHT: 231 LBS | SYSTOLIC BLOOD PRESSURE: 130 MMHG | HEIGHT: 77 IN | HEART RATE: 60 BPM

## 2019-12-09 VITALS
HEIGHT: 77 IN | BODY MASS INDEX: 27.59 KG/M2 | DIASTOLIC BLOOD PRESSURE: 88 MMHG | WEIGHT: 233.69 LBS | SYSTOLIC BLOOD PRESSURE: 134 MMHG | HEART RATE: 60 BPM

## 2019-12-09 VITALS — HEIGHT: 77 IN | BODY MASS INDEX: 27.51 KG/M2 | WEIGHT: 233 LBS

## 2019-12-09 DIAGNOSIS — Z95.810 ICD (IMPLANTABLE CARDIOVERTER-DEFIBRILLATOR) IN PLACE: ICD-10-CM

## 2019-12-09 DIAGNOSIS — E78.2 MIXED HYPERLIPIDEMIA: ICD-10-CM

## 2019-12-09 DIAGNOSIS — I25.10 CORONARY ARTERY DISEASE INVOLVING NATIVE CORONARY ARTERY OF NATIVE HEART WITHOUT ANGINA PECTORIS: ICD-10-CM

## 2019-12-09 DIAGNOSIS — I25.3 LEFT VENTRICULAR ANEURYSM: ICD-10-CM

## 2019-12-09 DIAGNOSIS — I48.0 PAROXYSMAL ATRIAL FIBRILLATION: ICD-10-CM

## 2019-12-09 DIAGNOSIS — I50.42 CHRONIC COMBINED SYSTOLIC AND DIASTOLIC CONGESTIVE HEART FAILURE: Primary | ICD-10-CM

## 2019-12-09 DIAGNOSIS — I50.42 CHRONIC COMBINED SYSTOLIC AND DIASTOLIC CONGESTIVE HEART FAILURE: ICD-10-CM

## 2019-12-09 LAB
ASCENDING AORTA: 3.19 CM
AV INDEX (PROSTH): 0.68
AV MEAN GRADIENT: 3 MMHG
AV PEAK GRADIENT: 6 MMHG
AV VALVE AREA: 2.39 CM2
AV VELOCITY RATIO: 0.76
BSA FOR ECHO PROCEDURE: 2.39 M2
CV ECHO LV RWT: 0.27 CM
CV STRESS BASE HR: 60 BPM
DIASTOLIC BLOOD PRESSURE: 99 MMHG
DOP CALC AO PEAK VEL: 1.23 M/S
DOP CALC AO VTI: 27.42 CM
DOP CALC LVOT AREA: 3.5 CM2
DOP CALC LVOT DIAMETER: 2.12 CM
DOP CALC LVOT PEAK VEL: 0.94 M/S
DOP CALC LVOT STROKE VOLUME: 65.59 CM3
DOP CALCLVOT PEAK VEL VTI: 18.59 CM
E WAVE DECELERATION TIME: 195.08 MSEC
E/A RATIO: 0.97
E/E' RATIO: 8.13 M/S
ECHO LV POSTERIOR WALL: 0.86 CM (ref 0.6–1.1)
FRACTIONAL SHORTENING: 25 % (ref 28–44)
INTERVENTRICULAR SEPTUM: 0.89 CM (ref 0.6–1.1)
LA MAJOR: 5.38 CM
LA MINOR: 5.34 CM
LA WIDTH: 4.06 CM
LEFT ATRIUM SIZE: 4.5 CM
LEFT ATRIUM VOLUME INDEX: 35 ML/M2
LEFT ATRIUM VOLUME: 83.24 CM3
LEFT INTERNAL DIMENSION IN SYSTOLE: 4.75 CM (ref 2.1–4)
LEFT VENTRICLE DIASTOLIC VOLUME INDEX: 72.8 ML/M2
LEFT VENTRICLE DIASTOLIC VOLUME: 173.1 ML
LEFT VENTRICLE MASS INDEX: 95 G/M2
LEFT VENTRICLE SYSTOLIC VOLUME INDEX: 44.1 ML/M2
LEFT VENTRICLE SYSTOLIC VOLUME: 104.96 ML
LEFT VENTRICULAR INTERNAL DIMENSION IN DIASTOLE: 6.3 CM (ref 3.5–6)
LEFT VENTRICULAR MASS: 226.58 G
LV LATERAL E/E' RATIO: 7.63 M/S
LV SEPTAL E/E' RATIO: 8.71 M/S
MV PEAK A VEL: 0.63 M/S
MV PEAK E VEL: 0.61 M/S
OHS CV CPX 1 MINUTE RECOVERY HEART RATE: 71 BPM
OHS CV CPX 85 PERCENT MAX PREDICTED HEART RATE MALE: 142
OHS CV CPX ANAEROBIC THRESHOLD DIASTOLIC BLOOD PRESSURE: 74 MMHG
OHS CV CPX ANAEROBIC THRESHOLD HEART RATE: 83
OHS CV CPX ANAEROBIC THRESHOLD RATE PRESSURE PRODUCT: NORMAL
OHS CV CPX ANAEROBIC THRESHOLD SYSTOLIC BLOOD PRESSURE: 123
OHS CV CPX DATA GRADE - AT: 3.5
OHS CV CPX DATA GRADE - PEAK: 4.5
OHS CV CPX DATA O2 SAT - PEAK: 98
OHS CV CPX DATA O2 SAT - REST: 96
OHS CV CPX DATA SPEED - AT: 2.6
OHS CV CPX DATA SPEED - PEAK: 2.9
OHS CV CPX DATA TIME - AT: 5.55
OHS CV CPX DATA TIME - PEAK: 7.06
OHS CV CPX DATA VE/VCO2 - AT: 28
OHS CV CPX DATA VE/VCO2 - PEAK: 30
OHS CV CPX DATA VE/VO2 - AT: 26
OHS CV CPX DATA VE/VO2 - PEAK: 29
OHS CV CPX DATA VO2 - AT: 11.7
OHS CV CPX DATA VO2 - PEAK: 13.8
OHS CV CPX DATA VO2 - REST: 2.8
OHS CV CPX FEV1/FVC: 0.62
OHS CV CPX FORCED EXPIRATORY VOLUME: 2.34
OHS CV CPX FORCED VITAL CAPACITY (FVC): 3.78
OHS CV CPX HIGHEST VO: 31
OHS CV CPX MAX PREDICTED HEART RATE: 167
OHS CV CPX MAXIMAL VOLUNTARY VENTILATION (MVV) PREDICTED: 93.6
OHS CV CPX MAXIMAL VOLUNTARY VENTILATION (MVV): 86
OHS CV CPX MAXIUMUM EXERCISE VENTILATION (VE MAX): 40
OHS CV CPX PATIENT AGE: 53
OHS CV CPX PATIENT HEIGHT IN: 77
OHS CV CPX PATIENT IS FEMALE AGE 11-19: 0
OHS CV CPX PATIENT IS FEMALE AGE GREATER THAN 19: 0
OHS CV CPX PATIENT IS FEMALE AGE LESS THAN 11: 0
OHS CV CPX PATIENT IS FEMALE: 0
OHS CV CPX PATIENT IS MALE AGE 11-25: 0
OHS CV CPX PATIENT IS MALE AGE GREATER THAN 25: 1
OHS CV CPX PATIENT IS MALE AGE LESS THAN 11: 0
OHS CV CPX PATIENT IS MALE GREATER THAN 18: 1
OHS CV CPX PATIENT IS MALE LESS THAN OR EQUAL TO 18: 0
OHS CV CPX PATIENT IS MALE: 1
OHS CV CPX PATIENT WEIGHT RETURNED IN OZ: 3728
OHS CV CPX PEAK DIASTOLIC BLOOD PRESSURE: 81 MMHG
OHS CV CPX PEAK HEAR RATE: 87 BPM
OHS CV CPX PEAK RATE PRESSURE PRODUCT: NORMAL
OHS CV CPX PEAK SYSTOLIC BLOOD PRESSURE: 132 MMHG
OHS CV CPX PERCENT BODY FAT: 22
OHS CV CPX PERCENT MAX PREDICTED HEART RATE ACHIEVED: 52
OHS CV CPX PREDICTED VO2: 31 ML/KG/MIN
OHS CV CPX RATE PRESSURE PRODUCT PRESENTING: 8160
OHS CV CPX REST PET CO2: 31
OHS CV CPX VE/VCO2 SLOPE: 23
PISA TR MAX VEL: 2.6 M/S
PULM VEIN S/D RATIO: 1.35
PV PEAK D VEL: 0.37 M/S
PV PEAK S VEL: 0.5 M/S
RA MAJOR: 5.27 CM
RA PRESSURE: 3 MMHG
RA WIDTH: 3.58 CM
RIGHT VENTRICULAR END-DIASTOLIC DIMENSION: 4.67 CM
SINUS: 3.07 CM
STJ: 2.64 CM
STRESS ECHO POST EXERCISE DUR MIN: 7 MINUTES
STRESS ECHO POST EXERCISE DUR SEC: 4 SECONDS
SYSTOLIC BLOOD PRESSURE: 136 MMHG
TDI LATERAL: 0.08 M/S
TDI SEPTAL: 0.07 M/S
TDI: 0.08 M/S
TR MAX PG: 27 MMHG
TRICUSPID ANNULAR PLANE SYSTOLIC EXCURSION: 1.35 CM
TV REST PULMONARY ARTERY PRESSURE: 30 MMHG

## 2019-12-09 PROCEDURE — 99213 OFFICE O/P EST LOW 20 MIN: CPT | Mod: PBBFAC,25,NTX | Performed by: INTERNAL MEDICINE

## 2019-12-09 PROCEDURE — C8929 TTE W OR WO FOL WCON,DOPPLER: HCPCS | Mod: PBBFAC,TXP | Performed by: INTERNAL MEDICINE

## 2019-12-09 PROCEDURE — 99215 PR OFFICE/OUTPT VISIT, EST, LEVL V, 40-54 MIN: ICD-10-PCS | Mod: S$PBB,NTX,, | Performed by: INTERNAL MEDICINE

## 2019-12-09 PROCEDURE — 93306 ECHO (CUPID ONLY): ICD-10-PCS | Mod: 26,S$PBB,TXP, | Performed by: INTERNAL MEDICINE

## 2019-12-09 PROCEDURE — 99999 PR PBB SHADOW E&M-EST. PATIENT-LVL III: CPT | Mod: PBBFAC,TXP,, | Performed by: INTERNAL MEDICINE

## 2019-12-09 PROCEDURE — 93306 TTE W/DOPPLER COMPLETE: CPT | Mod: 26,S$PBB,TXP, | Performed by: INTERNAL MEDICINE

## 2019-12-09 PROCEDURE — 99999 PR PBB SHADOW E&M-EST. PATIENT-LVL III: ICD-10-PCS | Mod: PBBFAC,TXP,, | Performed by: INTERNAL MEDICINE

## 2019-12-09 PROCEDURE — 94621 CARDIOPULM EXERCISE TESTING: CPT | Mod: PBBFAC,NTX | Performed by: INTERNAL MEDICINE

## 2019-12-09 PROCEDURE — 99215 OFFICE O/P EST HI 40 MIN: CPT | Mod: S$PBB,NTX,, | Performed by: INTERNAL MEDICINE

## 2019-12-09 PROCEDURE — 94621 CARDIOPULMONARY EXERCISE TESTING (CUPID ONLY): ICD-10-PCS | Mod: 26,S$PBB,TXP, | Performed by: INTERNAL MEDICINE

## 2019-12-09 RX ADMIN — HUMAN ALBUMIN MICROSPHERES AND PERFLUTREN 0.66 MG: 10; .22 INJECTION, SOLUTION INTRAVENOUS at 09:12

## 2019-12-09 NOTE — PROGRESS NOTES
Subjective:   Initial evaluation of heart transplant candidacy.    HPI:  Mr. Collado is a 54 y.o. year old Black or  male who has presents to be considered for advanced surgical options (LVAD/OHT).    53 year old male with history of HTN s/p AMI (chest pain) LAD stent in June 2018. He did well but was hospitalized with fluid overload twice and recently in February 2019. Now he is on enrtesto that was added to medical therapy including aldactone. See notes below (although I have not seen it there is an echo with 20% EF). S/p ICD     Since last visit he is doing well VALDES FC II NYHA On entresto . No cahnge from the past visit    Preliminary CPX Peak VO2 13.9       Six minute 1200 feet    At this moment he is FC II NYHA and no PND or orthopnea. No fluid retention in lower extremities    Feels better on entresto less shortness of breath more stamina    Resting spirometry reveals an FVC = 3.61L which is 58.56% of predicted, an FEV1 of 2.19L, which is 43.95% of predicted and an FEV1/FVC ratio of 60.66%. The MVV = 87.6 L/min, which is 50.01% of predicted.     The respiratory exchange ratio (RER) was 1, suggesting suboptimal effort.     The breathing reserve is calculated at 54.79%, which is normal.     The peak VO2 was 13.4 ml/kg/min which is 41.23% of predicted equating to a functional capacity of 3.83 METS indicating severe functional impairment.     The anaerobic threshold (AT), which occurred at a heart rate of 78bpm, was 11.9 ml/kg/min, which is 36.62% of the predicted VO2 and is reduced.     The peak VO2 Lean was 16.48 ml/kg of lean body weight/min indicating a poor prognosis in heart failure.     The VE/VCO2 Owen was 26.1. The Resting PetCO2 was 33.0.     Severe functional impairment associated with a normal breathing reserve, suboptimal effort, and a reduced AT. These findings are indicative of functional impairment secondary to circulatory insufficiency.      2018   o The left ventricle is  mildly dilated.   o Ejection Fraction = 50-55%.   o The left ventricular ejection fraction is grossly normal.   o There is mild concentric left ventricular hypertrophy.   o There is mild anterior wall hypokinesis.        - Coronary angiography data  Dominance :co-dominant system.   Left main: Large caliber vessel, divides into the left anterior   descending and left circumflex. LM is Non-obstructive   Left anterior descending: Large caliber vessel giving rise to 3   major diagonals. Mid LAD 99% stenosis. Other wise LAD and   diagonals are Non-obstructive   Left circumflex:  Medium  caliber vessel giving rise to 2 major   obtuse marginals. LCX  And OM are Non-obstructive   Right coronary artery: Small caliber vessel. Distal RCA .   Distal RCA fills via collaterals from right to right.     - Left Heart Catheterization data  Aortic Pressure: 98/76 mmHg  LVEDP 31 mmHg  LV gram 55-60 %  Ao-LV gradients: none.     - IVUS data  Pre PCI IVUS data:  Proximal reference luminal diameter:  3.5 mm  Distal reference luminal diameter: 3.5 mm     Percutaneous Coronary Intervention  Successful PCI of Mid left anterior descending  99 % stenosis was   reduced to 0 % by pre dilatation with 2.5 x 15 mm balloon   followed by placement of 3.5 x 24 mm Promus Premier SENAIT. post   dilated with 3.5 x 20 mm balloon with excellent angiographic   results. IVUS guided.     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  - Succesful PCI of Mid left anterior descending artery with one   SENAIT   - Distal RCA  (small vessel)  - Increased left ventricular end diastolic pressure.    Clinic visit January 2019    This is a 53-year-old male who recently underwent LAD stenting for ST elevation MI, ischemic cardiomyopathy with EF of 35-40% post STEMI, hospital course complicated by paroxysmal atrial fibrillation and cardiogenic shock. his most recent EF is 20-25%. He is status post  ICD placement for primary prevention.     There is a large sized, moderate to severe intensity basal to apical anterior and septal fixed perfusion abnormality involving 45% of the LV myocardium in the distribution of the proximal LAD territory consistent with non-transmural infarct.  · Whole heart absolute myocardial perfusion (cc/min/g) averaged 0.56 at rest (which is reduced due to the infarct), 0.99 at stress (which is moderately reduced), and 1.7 CFR (which is mildly reduced partially due to the infarct).  · Outside of the above defect, coronary flow capacity is mildly reduced globally.  · Gated perfusion images showed an ejection fraction of 38 % at rest and 41 % during stress. Normal is >51%.  · There is hypokinesis of the septal and anterior walls at rest and stress.  · LV cavity size is mildly enlarged at rest and mildly enlarged at stress.  · The EKG portion of this study is negative for myocardial ischemia.  · The patient reported headache during the stress test.  · Arrhythmias during stress: occasional PVCs.      Since last visit continue to do well (has tolerated high dose on entresto. FC II NYHA    Past Medical History:   Diagnosis Date    Acute hypoxemic respiratory failure     Acute kidney injury     Aneurysm     CHF (congestive heart failure)     Coronary artery disease     Diabetic amyotrophy associated with type 2 diabetes mellitus     Edema due to congestive heart failure     Fever     Heart failure     High blood pressure     History of respiratory failure     Hypertension     ICD (implantable cardioverter-defibrillator) in place     Inflammation of lung     Irregular heartbeat     SOB (shortness of breath)      Past Surgical History:   Procedure Laterality Date    atrial difibrillator  11/26/2018       Family History   Problem Relation Age of Onset    Hypertension Mother        Review of Systems   Constitution: Negative. Negative for chills, decreased appetite, diaphoresis,  "fever, malaise/fatigue, night sweats, weight gain and weight loss.   HENT: Negative.    Eyes: Negative.    Cardiovascular: Negative for chest pain, claudication, cyanosis, dyspnea on exertion, irregular heartbeat, leg swelling, near-syncope, orthopnea and palpitations.   Respiratory: Negative.  Negative for cough, shortness of breath, sleep disturbances due to breathing, snoring, sputum production and wheezing.    Endocrine: Negative.    Hematologic/Lymphatic: Negative.    Skin: Negative.    Musculoskeletal: Negative.    Gastrointestinal: Negative.  Negative for abdominal pain and diarrhea.   Genitourinary: Negative.    Neurological: Negative.  Negative for weakness.   Psychiatric/Behavioral: Negative.        Objective:   Blood pressure 134/88, pulse 60, height 6' 5" (1.956 m), weight 106 kg (233 lb 11 oz).body mass index is 27.71 kg/m².    Physical Exam   Constitutional: He is oriented to person, place, and time. He appears well-developed and well-nourished.   HENT:   Head: Normocephalic and atraumatic.   Eyes: Pupils are equal, round, and reactive to light. Conjunctivae and EOM are normal.   Neck: Normal range of motion. Neck supple. JVD present.   Cardiovascular: Normal rate and regular rhythm. PMI is displaced. Exam reveals no gallop and no friction rub.   No murmur heard.  Pulmonary/Chest: Breath sounds normal. No respiratory distress. He has no wheezes. He has no rales. He exhibits no tenderness.   Abdominal: Soft. Bowel sounds are normal. He exhibits no distension and no mass. There is no hepatosplenomegaly, splenomegaly or hepatomegaly. There is no tenderness. There is no rebound, no guarding and no CVA tenderness.   No hepatoslenomegaly   Musculoskeletal: Normal range of motion. He exhibits no edema or tenderness.   Neurological: He is alert and oriented to person, place, and time. He has normal reflexes. No cranial nerve deficit. He exhibits normal muscle tone. Coordination normal.   Skin: Skin is warm and " dry.   Psychiatric: He has a normal mood and affect.       Labs:      Chemistry        Component Value Date/Time     09/09/2019 0946    K 3.8 09/09/2019 0946     09/09/2019 0946    CO2 30 (H) 09/09/2019 0946    BUN 15 09/09/2019 0946    CREATININE 1.0 09/09/2019 0946     (H) 09/09/2019 0946        Component Value Date/Time    CALCIUM 9.0 09/09/2019 0946    ALKPHOS 132 09/09/2019 0946    AST 26 09/09/2019 0946    ALT 24 09/09/2019 0946    BILITOT 1.4 (H) 09/09/2019 0946    ESTGFRAFRICA >60.0 09/09/2019 0946    EGFRNONAA >60.0 09/09/2019 0946          No results found for: MG  Lab Results   Component Value Date    WBC 4.77 03/01/2019    HGB 13.2 (L) 03/01/2019    HCT 43.6 03/01/2019    MCV 83 03/01/2019     03/01/2019     BNP   Date Value Ref Range Status   03/01/2019 563 (H) 0 - 99 pg/mL Final     Comment:     Values of less than 100 pg/ml are consistent with non-CHF populations.     No results found for this or any previous visit.    Labs were reviewed with the patient.    Assessment:      1. Chronic combined systolic and diastolic congestive heart failure    2. Coronary artery disease involving native coronary artery of native heart without angina pectoris    3. ICD (implantable cardioverter-defibrillator) in place    4. Left ventricular aneurysm    5. Mixed hyperlipidemia    6. Paroxysmal atrial fibrillation        Plan:     HFrEF on entresto full dose 97/103 improving clinically JACKMAN slightly better    RTC 6 months with CPX     Resting spirometry reveals an FVC = 3.61L which is 58.56% of predicted, an FEV1 of 2.19L, which is 43.95% of predicted and an FEV1/FVC ratio of 60.66%. The MVV = 87.6 L/min, which is 50.01% of predicted.     The respiratory exchange ratio (RER) was 1, suggesting suboptimal effort.     The breathing reserve is calculated at 54.79%, which is normal.     The peak VO2 was 13.4 ml/kg/min which is 41.23% of predicted equating to a functional capacity of 3.83 METS  indicating severe functional impairment.     The anaerobic threshold (AT), which occurred at a heart rate of 78bpm, was 11.9 ml/kg/min, which is 36.62% of the predicted VO2 and is reduced.     The peak VO2 Lean was 16.48 ml/kg of lean body weight/min indicating a poor prognosis in heart failure.     The VE/VCO2 Wolfe was 26.1. The Resting PetCO2 was 33.0.     Severe functional impairment associated with a normal breathing reserve, suboptimal effort, and a reduced AT. These findings are indicative of functional impairment secondary to circulatory insufficiency.    RTC 3 month with 2 D echo and CPX    Patient is now NYHA II ACC stage C  Recommend 2 gram sodium restriction and 1500cc fluid restriction.  Encourage physical activity with graded exercise program.  Requested patient to weigh themselves daily, and to notify us if their weight increases by more than 3 lbs in 1 day or 5 lbs in 1 week.     Transplant Candidacy: Patient is a 54 y.o. year old male with heart failure is being seen for possible LVAD and OHT. In my opinion, he is  an excellent LVAD and OHT candidate. Patient did  meet with MCS and/or pre-transplant coordinator at the end of this visit for workup. he is  scheduled for risk stratification testing with CPX/RHC. The patient will follow up with pre-transplant.    UNOS Patient Status  Functional Status: 80% - Normal activity with effort: some symptoms of disease  Physical Capacity: Limited Mobility  Working for Income: No  If no, reason not working: Disability    Rufino Real MD

## 2019-12-09 NOTE — PROGRESS NOTES
Procedure explained. 22 g sl started in left forearm for optison use. Per carmine galindo sonographer, optison given ivp via sl for imaging. Denies transfusion rxn. Tolerated well. Sl d/radha after . Pressure applied.

## 2019-12-09 NOTE — LETTER
December 9, 2019        José Antonio Iraheta  [address] [place] [address]  Phone: [phone]  Fax: [phone]             [facility] [address]  [facility] [address]  Phone: [phone]   Patient: Jose Collado   MR Number: [id number]   YOB: 1965   Date of Visit: 12/9/2019       Dear Dr. José Antonio Iraheta    Thank you for referring Jose Collado to me for evaluation. Attached you will find relevant portions of my assessment and plan of care.    If you have questions, please do not hesitate to call me. I look forward to following Jose Collado along with you.    Sincerely,    Rufino Real MD    Enclosure    If you would like to receive this communication electronically, please contact [email or web address] or [phone] to request [employer] access.    [employer] is a tool which provides read-only access to select patient information with whom you have a relationship. Its easy to use and provides real time access to review your patients record including encounter summaries, notes, results, and demographic information.    If you feel you have received this communication in error or would no longer like to receive these types of communications, please e-mail [email or web address]

## 2019-12-10 ENCOUNTER — TELEPHONE (OUTPATIENT)
Dept: ADMINISTRATIVE | Facility: HOSPITAL | Age: 54
End: 2019-12-10

## 2019-12-17 RX ORDER — RIVAROXABAN 20 MG/1
TABLET, FILM COATED ORAL
Qty: 30 TABLET | Refills: 3 | Status: SHIPPED | OUTPATIENT
Start: 2019-12-17 | End: 2020-04-21

## 2019-12-17 RX ORDER — PANTOPRAZOLE SODIUM 40 MG/1
TABLET, DELAYED RELEASE ORAL
Qty: 30 TABLET | Refills: 3 | Status: SHIPPED | OUTPATIENT
Start: 2019-12-17 | End: 2020-04-17

## 2019-12-17 RX ORDER — TRAZODONE HYDROCHLORIDE 50 MG/1
TABLET ORAL
Qty: 90 TABLET | Refills: 0 | Status: SHIPPED | OUTPATIENT
Start: 2019-12-17 | End: 2020-02-18

## 2019-12-30 ENCOUNTER — PATIENT MESSAGE (OUTPATIENT)
Dept: TRANSPLANT | Facility: CLINIC | Age: 54
End: 2019-12-30

## 2020-02-14 ENCOUNTER — PATIENT MESSAGE (OUTPATIENT)
Dept: TRANSPLANT | Facility: CLINIC | Age: 55
End: 2020-02-14

## 2020-02-18 RX ORDER — SPIRONOLACTONE 50 MG/1
TABLET, FILM COATED ORAL
Qty: 30 TABLET | Refills: 1 | Status: SHIPPED | OUTPATIENT
Start: 2020-02-18 | End: 2020-06-15

## 2020-02-18 RX ORDER — TRAZODONE HYDROCHLORIDE 50 MG/1
TABLET ORAL
Qty: 30 TABLET | Refills: 0 | Status: SHIPPED | OUTPATIENT
Start: 2020-02-18 | End: 2020-04-21

## 2020-03-13 RX ORDER — SERTRALINE HYDROCHLORIDE 25 MG/1
TABLET, FILM COATED ORAL
Qty: 30 TABLET | Refills: 1 | Status: SHIPPED | OUTPATIENT
Start: 2020-03-13 | End: 2022-01-03

## 2020-03-13 RX ORDER — ATORVASTATIN CALCIUM 80 MG/1
TABLET, FILM COATED ORAL
Qty: 30 TABLET | Refills: 1 | Status: SHIPPED | OUTPATIENT
Start: 2020-03-13 | End: 2020-06-15

## 2020-03-23 RX ORDER — GLIPIZIDE 5 MG/1
TABLET ORAL
Qty: 30 TABLET | Refills: 0 | Status: SHIPPED | OUTPATIENT
Start: 2020-03-23 | End: 2020-04-21

## 2020-04-17 RX ORDER — PANTOPRAZOLE SODIUM 40 MG/1
TABLET, DELAYED RELEASE ORAL
Qty: 30 TABLET | Refills: 3 | Status: SHIPPED | OUTPATIENT
Start: 2020-04-17 | End: 2020-08-13

## 2020-04-21 RX ORDER — GLIPIZIDE 5 MG/1
TABLET ORAL
Qty: 30 TABLET | Refills: 1 | Status: SHIPPED | OUTPATIENT
Start: 2020-04-21 | End: 2020-07-06

## 2020-04-21 RX ORDER — TRAZODONE HYDROCHLORIDE 50 MG/1
TABLET ORAL
Qty: 30 TABLET | Refills: 1 | Status: SHIPPED | OUTPATIENT
Start: 2020-04-21 | End: 2020-07-06

## 2020-04-21 RX ORDER — RIVAROXABAN 20 MG/1
TABLET, FILM COATED ORAL
Qty: 30 TABLET | Refills: 3 | Status: SHIPPED | OUTPATIENT
Start: 2020-04-21 | End: 2020-08-13

## 2020-05-07 DIAGNOSIS — I50.42 CHRONIC COMBINED SYSTOLIC AND DIASTOLIC CONGESTIVE HEART FAILURE: Primary | ICD-10-CM

## 2020-05-19 ENCOUNTER — TELEPHONE (OUTPATIENT)
Dept: TRANSPLANT | Facility: CLINIC | Age: 55
End: 2020-05-19

## 2020-05-19 NOTE — TELEPHONE ENCOUNTER
covid screen no longer needed for  CPX on 6/8/20.   Left pt a voicemail requesting call back to discuss changes to his schedule.   covid appt cancelled.

## 2020-05-22 ENCOUNTER — TELEPHONE (OUTPATIENT)
Dept: TRANSPLANT | Facility: CLINIC | Age: 55
End: 2020-05-22

## 2020-05-22 NOTE — TELEPHONE ENCOUNTER
Left voicemail for pt requesting call back to reschedule covid screen prior to cpx as it IS required again for this procedure.    appt scheduled for 6/6/20 in [employer] thru.     Sent [facility] message to pt.

## 2020-06-01 ENCOUNTER — TELEPHONE (OUTPATIENT)
Dept: TRANSPLANT | Facility: CLINIC | Age: 55
End: 2020-06-01

## 2020-06-01 LAB — SARS-COV-2 RNA RESP QL NAA+PROBE: NEGATIVE

## 2020-06-01 NOTE — TELEPHONE ENCOUNTER
"Received copy via fax of covid-19 NEGATIVE result from pt's local MD dated 5/29/20.    Attempted to reach pt.  Spoke with wife who reports they went to local MD b/c "we can't come to [place] twice for all this nonsense".  Advised wife that pt's covid screen MUST be within 48hours of his cpx so the screen he had done locally is not valid for his test on 6/8/20.   Wife insists that they can not come to [place] more than once.     Advised wife that pt is currently scheduled for covid screen in [place] not [place].  Advised that voicemails were left for pt and a [employer] message was sent to pt but he has not read that [employer] message and did not return phone call regarding the [place] appt.  An appt slip was mailed to him as well.     After much discussion, wife is agreeable to taking pt to [place] for covid screen as scheduled on 6/6  "

## 2020-06-05 ENCOUNTER — TELEPHONE (OUTPATIENT)
Dept: TRANSPLANT | Facility: CLINIC | Age: 55
End: 2020-06-05

## 2020-06-05 DIAGNOSIS — I50.42 CHRONIC COMBINED SYSTOLIC AND DIASTOLIC CONGESTIVE HEART FAILURE: Primary | ICD-10-CM

## 2020-06-05 NOTE — TELEPHONE ENCOUNTER
I spoke with Mrs Collado, who canceled tomorrow and next weeks appts due to weather (Jorge Luis).  Is emphatic about keeping appts with Dr Real, and rescheduled to next available- June 22- scheduled Nurse visit for Covid (order placed), CPX, and visit with Dr Real.  Mailed appts.

## 2020-06-22 ENCOUNTER — CLINICAL SUPPORT (OUTPATIENT)
Dept: TRANSPLANT | Facility: CLINIC | Age: 55
End: 2020-06-22
Payer: MEDICAID

## 2020-06-22 ENCOUNTER — OFFICE VISIT (OUTPATIENT)
Dept: TRANSPLANT | Facility: CLINIC | Age: 55
End: 2020-06-22
Payer: MEDICAID

## 2020-06-22 ENCOUNTER — HOSPITAL ENCOUNTER (OUTPATIENT)
Dept: CARDIOLOGY | Facility: HOSPITAL | Age: 55
Discharge: HOME | End: 2020-06-22
Attending: INTERNAL MEDICINE
Payer: MEDICAID

## 2020-06-22 VITALS
SYSTOLIC BLOOD PRESSURE: 122 MMHG | BODY MASS INDEX: 26.09 KG/M2 | HEART RATE: 67 BPM | HEIGHT: 77 IN | WEIGHT: 221 LBS | DIASTOLIC BLOOD PRESSURE: 93 MMHG

## 2020-06-22 VITALS
DIASTOLIC BLOOD PRESSURE: 83 MMHG | HEIGHT: 77 IN | WEIGHT: 222 LBS | SYSTOLIC BLOOD PRESSURE: 122 MMHG | BODY MASS INDEX: 26.21 KG/M2 | HEART RATE: 60 BPM

## 2020-06-22 DIAGNOSIS — I48.0 PAROXYSMAL ATRIAL FIBRILLATION: Primary | ICD-10-CM

## 2020-06-22 DIAGNOSIS — I50.42 CHRONIC COMBINED SYSTOLIC AND DIASTOLIC CONGESTIVE HEART FAILURE: ICD-10-CM

## 2020-06-22 DIAGNOSIS — Z13.9 SCREENING FOR UNSPECIFIED CONDITION: Primary | ICD-10-CM

## 2020-06-22 DIAGNOSIS — I25.10 CORONARY ARTERY DISEASE INVOLVING NATIVE CORONARY ARTERY OF NATIVE HEART WITHOUT ANGINA PECTORIS: ICD-10-CM

## 2020-06-22 DIAGNOSIS — E78.2 MIXED HYPERLIPIDEMIA: ICD-10-CM

## 2020-06-22 DIAGNOSIS — Z95.810 ICD (IMPLANTABLE CARDIOVERTER-DEFIBRILLATOR) IN PLACE: ICD-10-CM

## 2020-06-22 LAB
CV STRESS BASE HR: 67 BPM
DIASTOLIC BLOOD PRESSURE: 93 MMHG
OHS CV CPX 1 MINUTE RECOVERY HEART RATE: 77 BPM
OHS CV CPX 85 PERCENT MAX PREDICTED HEART RATE MALE: 142
OHS CV CPX ANAEROBIC THRESHOLD DIASTOLIC BLOOD PRESSURE: 63 MMHG
OHS CV CPX ANAEROBIC THRESHOLD HEART RATE: 82
OHS CV CPX ANAEROBIC THRESHOLD RATE PRESSURE PRODUCT: 9676
OHS CV CPX ANAEROBIC THRESHOLD SYSTOLIC BLOOD PRESSURE: 118
OHS CV CPX DATA GRADE - AT: 4.1
OHS CV CPX DATA GRADE - PEAK: 5.7
OHS CV CPX DATA O2 SAT - PEAK: 96
OHS CV CPX DATA O2 SAT - REST: 96
OHS CV CPX DATA SPEED - AT: 2.8
OHS CV CPX DATA SPEED - PEAK: 3.2
OHS CV CPX DATA TIME - AT: 6.67
OHS CV CPX DATA TIME - PEAK: 8.72
OHS CV CPX DATA VE/VCO2 - AT: 31
OHS CV CPX DATA VE/VCO2 - PEAK: 31
OHS CV CPX DATA VE/VO2 - AT: 28
OHS CV CPX DATA VE/VO2 - PEAK: 32
OHS CV CPX DATA VO2 - AT: 10.6
OHS CV CPX DATA VO2 - PEAK: 13.2
OHS CV CPX DATA VO2 - REST: 2.8
OHS CV CPX FEV1/FVC: 0.58
OHS CV CPX FORCED EXPIRATORY VOLUME: 2.15
OHS CV CPX FORCED VITAL CAPACITY (FVC): 3.73
OHS CV CPX HIGHEST VO: 32.2
OHS CV CPX MAX PREDICTED HEART RATE: 167
OHS CV CPX MAXIMAL VOLUNTARY VENTILATION (MVV) PREDICTED: 86
OHS CV CPX MAXIMAL VOLUNTARY VENTILATION (MVV): 72
OHS CV CPX MAXIUMUM EXERCISE VENTILATION (VE MAX): 40
OHS CV CPX PATIENT AGE: 53
OHS CV CPX PATIENT HEIGHT IN: 77
OHS CV CPX PATIENT IS FEMALE AGE 11-19: 0
OHS CV CPX PATIENT IS FEMALE AGE GREATER THAN 19: 0
OHS CV CPX PATIENT IS FEMALE AGE LESS THAN 11: 0
OHS CV CPX PATIENT IS FEMALE: 0
OHS CV CPX PATIENT IS MALE AGE 11-25: 0
OHS CV CPX PATIENT IS MALE AGE GREATER THAN 25: 1
OHS CV CPX PATIENT IS MALE AGE LESS THAN 11: 0
OHS CV CPX PATIENT IS MALE GREATER THAN 18: 1
OHS CV CPX PATIENT IS MALE LESS THAN OR EQUAL TO 18: 0
OHS CV CPX PATIENT IS MALE: 1
OHS CV CPX PATIENT WEIGHT RETURNED IN OZ: 3536
OHS CV CPX PEAK DIASTOLIC BLOOD PRESSURE: 82 MMHG
OHS CV CPX PEAK HEAR RATE: 89 BPM
OHS CV CPX PEAK RATE PRESSURE PRODUCT: NORMAL
OHS CV CPX PEAK SYSTOLIC BLOOD PRESSURE: 154 MMHG
OHS CV CPX PERCENT BODY FAT: 19.2
OHS CV CPX PERCENT MAX PREDICTED HEART RATE ACHIEVED: 53
OHS CV CPX PREDICTED VO2: 32.2 ML/KG/MIN
OHS CV CPX RATE PRESSURE PRODUCT PRESENTING: 8174
OHS CV CPX REST PET CO2: 29
OHS CV CPX VE/VCO2 SLOPE: 25.5
SARS-COV-2 RDRP RESP QL NAA+PROBE: NEGATIVE
STRESS ECHO POST EXERCISE DUR MIN: 8 MINUTES
STRESS ECHO POST EXERCISE DUR SEC: 43 SECONDS
SYSTOLIC BLOOD PRESSURE: 122 MMHG

## 2020-06-22 PROCEDURE — 99999 PR PBB SHADOW E&M-EST. PATIENT-LVL III: CPT | Mod: PBBFAC,TXP,, | Performed by: INTERNAL MEDICINE

## 2020-06-22 PROCEDURE — 94621 CARDIOPULM EXERCISE TESTING: CPT | Mod: TXP

## 2020-06-22 PROCEDURE — 99215 OFFICE O/P EST HI 40 MIN: CPT | Mod: S$PBB,TXP,, | Performed by: INTERNAL MEDICINE

## 2020-06-22 PROCEDURE — 99999 PR PBB SHADOW E&M-EST. PATIENT-LVL III: ICD-10-PCS | Mod: PBBFAC,TXP,, | Performed by: INTERNAL MEDICINE

## 2020-06-22 PROCEDURE — 94621 CARDIOPULMONARY EXERCISE TESTING (CUPID ONLY): ICD-10-PCS | Mod: 26,TXP,, | Performed by: INTERNAL MEDICINE

## 2020-06-22 PROCEDURE — 99213 OFFICE O/P EST LOW 20 MIN: CPT | Mod: PBBFAC,25,TXP | Performed by: INTERNAL MEDICINE

## 2020-06-22 PROCEDURE — 94621 CARDIOPULM EXERCISE TESTING: CPT | Mod: 26,TXP,, | Performed by: INTERNAL MEDICINE

## 2020-06-22 PROCEDURE — U0002 COVID-19 LAB TEST NON-CDC: HCPCS | Mod: NTX

## 2020-06-22 PROCEDURE — 99215 PR OFFICE/OUTPT VISIT, EST, LEVL V, 40-54 MIN: ICD-10-PCS | Mod: S$PBB,TXP,, | Performed by: INTERNAL MEDICINE

## 2020-06-22 NOTE — LETTER
June 22, 2020        José Antonio Iraheta  [address] [place] [address]  Phone: [phone]  Fax: [phone]             [facility] [address]  [facility] [address]  Phone: [phone]   Patient: Jose Collado   MR Number: [id number]   YOB: 1965   Date of Visit: 6/22/2020       Dear Dr. José Antonio Iraheta    Thank you for referring Jose Collado to me for evaluation. Attached you will find relevant portions of my assessment and plan of care.    If you have questions, please do not hesitate to call me. I look forward to following Jose Collado along with you.    Sincerely,    Rufino Real MD    Enclosure    If you would like to receive this communication electronically, please contact [email or web address] or [phone] to request [employer] access.    [employer] is a tool which provides read-only access to select patient information with whom you have a relationship. Its easy to use and provides real time access to review your patients record including encounter summaries, notes, results, and demographic information.    If you feel you have received this communication in error or would no longer like to receive these types of communications, please e-mail [email or web address]

## 2020-06-22 NOTE — PROGRESS NOTES
Subjective:   Initial evaluation of heart transplant candidacy.    HPI:  Mr. Collado is a 54 y.o. year old Black or  male who has presents to be considered for advanced surgical options (LVAD/OHT).    54 year old male with history of HTN s/p AMI (chest pain) LAD stent in June 2018. He did well but was hospitalized with fluid overload twice and recently in February 2019. Now he is on enrtesto that was added to medical therapy including aldactone. See notes below (although I have not seen it there is an echo with 20% EF). S/p ICD     Since last visit he is doing well VALDES FC II NYHA On entresto . FC II NYHA     Peak VO2 the same 13.4 cc/kg/min he achieved AT      Six minute 1200 feet    At this moment he is FC II NYHA and no PND or orthopnea. No fluid retention in lower extremities    Feels better on entresto less shortness of breath more stamina    Resting spirometry reveals an FVC = 3.61L which is 58.56% of predicted, an FEV1 of 2.19L, which is 43.95% of predicted and an FEV1/FVC ratio of 60.66%. The MVV = 87.6 L/min, which is 50.01% of predicted.     The respiratory exchange ratio (RER) was 1, suggesting suboptimal effort.     The breathing reserve is calculated at 54.79%, which is normal.     The peak VO2 was 13.4 ml/kg/min which is 41.23% of predicted equating to a functional capacity of 3.83 METS indicating severe functional impairment.     The anaerobic threshold (AT), which occurred at a heart rate of 78bpm, was 11.9 ml/kg/min, which is 36.62% of the predicted VO2 and is reduced.     The peak VO2 Lean was 16.48 ml/kg of lean body weight/min indicating a poor prognosis in heart failure.     The VE/VCO2 [place] was 26.1. The Resting PetCO2 was 33.0.     Severe functional impairment associated with a normal breathing reserve, suboptimal effort, and a reduced AT. These findings are indicative of functional impairment secondary to circulatory insufficiency.      2018   o The left ventricle is  mildly dilated.   o Ejection Fraction = 50-55%.   o The left ventricular ejection fraction is grossly normal.   o There is mild concentric left ventricular hypertrophy.   o There is mild anterior wall hypokinesis.        - Coronary angiography data  Dominance :co-dominant system.   Left main: Large caliber vessel, divides into the left anterior   descending and left circumflex. LM is Non-obstructive   Left anterior descending: Large caliber vessel giving rise to 3   major diagonals. Mid LAD 99% stenosis. Other wise LAD and   diagonals are Non-obstructive   Left circumflex:  Medium  caliber vessel giving rise to 2 major   obtuse marginals. LCX  And OM are Non-obstructive   Right coronary artery: Small caliber vessel. Distal RCA .   Distal RCA fills via collaterals from right to right.     - Left Heart Catheterization data  Aortic Pressure: 98/76 mmHg  LVEDP 31 mmHg  LV gram 55-60 %  Ao-LV gradients: none.     - IVUS data  Pre PCI IVUS data:  Proximal reference luminal diameter:  3.5 mm  Distal reference luminal diameter: 3.5 mm     Percutaneous Coronary Intervention  Successful PCI of Mid left anterior descending  99 % stenosis was   reduced to 0 % by pre dilatation with 2.5 x 15 mm balloon   followed by placement of 3.5 x 24 mm Promus Premier SENAIT. post   dilated with 3.5 x 20 mm balloon with excellent angiographic   results. IVUS guided.     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  - Succesful PCI of Mid left anterior descending artery with one   SENAIT   - Distal RCA  (small vessel)  - Increased left ventricular end diastolic pressure.    Clinic visit January 2019    This is a 53-year-old male who recently underwent LAD stenting for ST elevation MI, ischemic cardiomyopathy with EF of 35-40% post STEMI, hospital course complicated by paroxysmal atrial fibrillation and cardiogenic shock. his most recent EF is 20-25%. He is status post  ICD placement for primary prevention.     There is a large sized, moderate to severe intensity basal to apical anterior and septal fixed perfusion abnormality involving 45% of the LV myocardium in the distribution of the proximal LAD territory consistent with non-transmural infarct.  · Whole heart absolute myocardial perfusion (cc/min/g) averaged 0.56 at rest (which is reduced due to the infarct), 0.99 at stress (which is moderately reduced), and 1.7 CFR (which is mildly reduced partially due to the infarct).  · Outside of the above defect, coronary flow capacity is mildly reduced globally.  · Gated perfusion images showed an ejection fraction of 38 % at rest and 41 % during stress. Normal is >51%.  · There is hypokinesis of the septal and anterior walls at rest and stress.  · LV cavity size is mildly enlarged at rest and mildly enlarged at stress.  · The EKG portion of this study is negative for myocardial ischemia.  · The patient reported headache during the stress test.  · Arrhythmias during stress: occasional PVCs.      Since last visit continue to do well (has tolerated high dose on entresto. FC II NYHA    Past Medical History:   Diagnosis Date    Acute hypoxemic respiratory failure     Acute kidney injury     Aneurysm     CHF (congestive heart failure)     Coronary artery disease     Diabetic amyotrophy associated with type 2 diabetes mellitus     Edema due to congestive heart failure     Fever     Heart failure     High blood pressure     History of respiratory failure     Hypertension     ICD (implantable cardioverter-defibrillator) in place     Inflammation of lung     Irregular heartbeat     SOB (shortness of breath)      Past Surgical History:   Procedure Laterality Date    atrial difibrillator  11/26/2018       Family History   Problem Relation Age of Onset    Hypertension Mother        Review of Systems   Constitution: Negative. Negative for chills, decreased appetite, diaphoresis,  "fever, malaise/fatigue, night sweats, weight gain and weight loss.   HENT: Negative.    Eyes: Negative.    Cardiovascular: Negative for chest pain, claudication, cyanosis, dyspnea on exertion, irregular heartbeat, leg swelling, near-syncope, orthopnea and palpitations.   Respiratory: Negative.  Negative for cough, hemoptysis, shortness of breath, sleep disturbances due to breathing, snoring, sputum production and wheezing.    Endocrine: Negative.    Hematologic/Lymphatic: Negative.    Skin: Negative.    Musculoskeletal: Negative.    Gastrointestinal: Negative.  Negative for abdominal pain and diarrhea.   Genitourinary: Negative.    Neurological: Negative.  Negative for weakness.   Psychiatric/Behavioral: Negative.        Objective:   Blood pressure 122/83, pulse 60, height 6' 5" (1.956 m), weight 100.7 kg (222 lb 0.1 oz).body mass index is 26.33 kg/m².    Physical Exam   Constitutional: He is oriented to person, place, and time. He appears well-developed and well-nourished.   HENT:   Head: Normocephalic and atraumatic.   Eyes: Pupils are equal, round, and reactive to light. Conjunctivae and EOM are normal.   Neck: Normal range of motion. Neck supple. JVD present.   Cardiovascular: Normal rate and regular rhythm. PMI is displaced. Exam reveals no gallop and no friction rub.   No murmur heard.  Pulmonary/Chest: Breath sounds normal. No respiratory distress. He has no wheezes. He has no rales. He exhibits no tenderness.   Abdominal: Soft. Bowel sounds are normal. He exhibits no distension and no mass. There is no hepatosplenomegaly, splenomegaly or hepatomegaly. There is no tenderness. There is no rebound, no guarding and no CVA tenderness.   No hepatoslenomegaly   Musculoskeletal: Normal range of motion. He exhibits no edema or tenderness.   Neurological: He is alert and oriented to person, place, and time. He has normal reflexes. No cranial nerve deficit. He exhibits normal muscle tone. Coordination normal.   Skin: " Skin is warm and dry.   Psychiatric: He has a normal mood and affect.       Labs:      Chemistry        Component Value Date/Time     09/09/2019 0946    K 3.8 09/09/2019 0946     09/09/2019 0946    CO2 30 (H) 09/09/2019 0946    BUN 15 09/09/2019 0946    CREATININE 1.0 09/09/2019 0946     (H) 09/09/2019 0946        Component Value Date/Time    CALCIUM 9.0 09/09/2019 0946    ALKPHOS 132 09/09/2019 0946    AST 26 09/09/2019 0946    ALT 24 09/09/2019 0946    BILITOT 1.4 (H) 09/09/2019 0946    ESTGFRAFRICA >60.0 09/09/2019 0946    EGFRNONAA >60.0 09/09/2019 0946          No results found for: MG  Lab Results   Component Value Date    WBC 4.77 03/01/2019    HGB 13.2 (L) 03/01/2019    HCT 43.6 03/01/2019    MCV 83 03/01/2019     03/01/2019     BNP   Date Value Ref Range Status   03/01/2019 563 (H) 0 - 99 pg/mL Final     Comment:     Values of less than 100 pg/ml are consistent with non-CHF populations.     No results found for this or any previous visit.    Labs were reviewed with the patient.    Assessment:      1. Paroxysmal atrial fibrillation    2. Mixed hyperlipidemia    3. ICD (implantable cardioverter-defibrillator) in place    4. Coronary artery disease involving native coronary artery of native heart without angina pectoris    5. Chronic combined systolic and diastolic congestive heart failure        Plan:     HFrEF on entresto full dose 97/103 improving clinically peak VO 2 the same     RTC 6 months with CPX        Patient is now NYHA II ACC stage C  Recommend 2 gram sodium restriction and 1500cc fluid restriction.  Encourage physical activity with graded exercise program.  Requested patient to weigh themselves daily, and to notify us if their weight increases by more than 3 lbs in 1 day or 5 lbs in 1 week.     Transplant Candidacy: Patient is a 54 y.o. year old male with heart failure is being seen for possible LVAD and OHT. In my opinion, he is  an excellent LVAD and OHT candidate.  Patient did  meet with MCS and/or pre-transplant coordinator at the end of this visit for workup. he is  scheduled for risk stratification testing with CPX/RHC. The patient will follow up with pre-transplant.    UNOS Patient Status  Functional Status: 80% - Normal activity with effort: some symptoms of disease  Physical Capacity: Limited Mobility  Working for Income: No  If no, reason not working: Disability    Rufino Real MD             Patient denies symptoms including cough, fevers, SOB, diarrhea, loss of taste or smell.   Informed patient of process for Covid test. Patient denies prior nasal surgery. Pt verbalized permission to proceed with test.    Nasopharyngeal specimen collected.  Patient tolerated procedure well.

## 2020-06-23 ENCOUNTER — TELEPHONE (OUTPATIENT)
Dept: TRANSPLANT | Facility: CLINIC | Age: 55
End: 2020-06-23

## 2020-06-23 NOTE — TELEPHONE ENCOUNTER
----- Message from Rufino Real MD sent at 6/23/2020  8:47 AM CDT -----  Regarding: RE: Do you want me to schedule RHC?  Keep it in transplant. H  ----- Message -----  From: Nicole Rider RN  Sent: 6/22/2020   4:24 PM CDT  To: Rufino Real MD  Subject: Do you want me to schedule RHC?                  Tim Joy, Do you want me to move to HF? Or keep in Preht?  Thanks  Nicole

## 2020-12-16 ENCOUNTER — TELEPHONE (OUTPATIENT)
Dept: ADMINISTRATIVE | Facility: HOSPITAL | Age: 55
End: 2020-12-16

## 2020-12-22 DIAGNOSIS — I50.42 CHRONIC COMBINED SYSTOLIC AND DIASTOLIC CONGESTIVE HEART FAILURE: Primary | ICD-10-CM

## 2020-12-30 ENCOUNTER — DOCUMENTATION ONLY (OUTPATIENT)
Dept: TRANSPLANT | Facility: CLINIC | Age: 55
End: 2020-12-30

## 2020-12-30 ENCOUNTER — CLINICAL SUPPORT (OUTPATIENT)
Dept: TRANSPLANT | Facility: CLINIC | Age: 55
End: 2020-12-30
Payer: MEDICARE

## 2020-12-30 ENCOUNTER — HOSPITAL ENCOUNTER (OUTPATIENT)
Dept: CARDIOLOGY | Facility: HOSPITAL | Age: 55
Discharge: HOME | End: 2020-12-30
Attending: INTERNAL MEDICINE
Payer: MEDICARE

## 2020-12-30 ENCOUNTER — OFFICE VISIT (OUTPATIENT)
Dept: TRANSPLANT | Facility: CLINIC | Age: 55
End: 2020-12-30
Payer: MEDICARE

## 2020-12-30 VITALS
BODY MASS INDEX: 25.87 KG/M2 | WEIGHT: 219.13 LBS | SYSTOLIC BLOOD PRESSURE: 106 MMHG | WEIGHT: 218 LBS | DIASTOLIC BLOOD PRESSURE: 68 MMHG | HEIGHT: 77 IN | BODY MASS INDEX: 25.74 KG/M2 | HEART RATE: 60 BPM | HEIGHT: 77 IN

## 2020-12-30 DIAGNOSIS — E78.2 MIXED HYPERLIPIDEMIA: ICD-10-CM

## 2020-12-30 DIAGNOSIS — I25.3 LEFT VENTRICULAR ANEURYSM: ICD-10-CM

## 2020-12-30 DIAGNOSIS — I50.42 CHRONIC COMBINED SYSTOLIC AND DIASTOLIC HEART FAILURE: Primary | ICD-10-CM

## 2020-12-30 DIAGNOSIS — I50.42 CHRONIC COMBINED SYSTOLIC AND DIASTOLIC CONGESTIVE HEART FAILURE: ICD-10-CM

## 2020-12-30 DIAGNOSIS — Z95.810 ICD (IMPLANTABLE CARDIOVERTER-DEFIBRILLATOR) IN PLACE: ICD-10-CM

## 2020-12-30 DIAGNOSIS — Z13.9 SCREENING FOR UNSPECIFIED CONDITION: Primary | ICD-10-CM

## 2020-12-30 DIAGNOSIS — I48.0 PAROXYSMAL ATRIAL FIBRILLATION: ICD-10-CM

## 2020-12-30 LAB
CV STRESS BASE HR: 60 BPM
DIASTOLIC BLOOD PRESSURE: 79 MMHG
OHS CV CPX 1 MINUTE RECOVERY HEART RATE: 70 BPM
OHS CV CPX 85 PERCENT MAX PREDICTED HEART RATE MALE: 141
OHS CV CPX ANAEROBIC THRESHOLD DIASTOLIC BLOOD PRESSURE: 66 MMHG
OHS CV CPX ANAEROBIC THRESHOLD HEART RATE: 68
OHS CV CPX ANAEROBIC THRESHOLD RATE PRESSURE PRODUCT: 6936
OHS CV CPX ANAEROBIC THRESHOLD SYSTOLIC BLOOD PRESSURE: 102
OHS CV CPX DATA GRADE - AT: 2.7
OHS CV CPX DATA GRADE - PEAK: 6.1
OHS CV CPX DATA O2 SAT - PEAK: 76
OHS CV CPX DATA O2 SAT - REST: 96
OHS CV CPX DATA SPEED - AT: 2.3
OHS CV CPX DATA SPEED - PEAK: 3.3
OHS CV CPX DATA TIME - AT: 4.48
OHS CV CPX DATA TIME - PEAK: 9.5
OHS CV CPX DATA VE/VCO2 - AT: 26
OHS CV CPX DATA VE/VCO2 - PEAK: 26
OHS CV CPX DATA VE/VO2 - AT: 23
OHS CV CPX DATA VE/VO2 - PEAK: 34
OHS CV CPX DATA VO2 - AT: 9.5
OHS CV CPX DATA VO2 - PEAK: 15.4
OHS CV CPX DATA VO2 - REST: 2
OHS CV CPX FEV1/FVC: 0.61
OHS CV CPX FORCED EXPIRATORY VOLUME: 2.36
OHS CV CPX FORCED VITAL CAPACITY (FVC): 3.9
OHS CV CPX HIGHEST VO: 32.2
OHS CV CPX MAX PREDICTED HEART RATE: 166
OHS CV CPX MAXIMAL VOLUNTARY VENTILATION (MVV) PREDICTED: 94.4
OHS CV CPX MAXIMAL VOLUNTARY VENTILATION (MVV): 80
OHS CV CPX MAXIUMUM EXERCISE VENTILATION (VE MAX): 47
OHS CV CPX PATIENT AGE: 54
OHS CV CPX PATIENT HEIGHT IN: 77
OHS CV CPX PATIENT IS FEMALE AGE 11-19: 0
OHS CV CPX PATIENT IS FEMALE AGE GREATER THAN 19: 0
OHS CV CPX PATIENT IS FEMALE AGE LESS THAN 11: 0
OHS CV CPX PATIENT IS FEMALE: 0
OHS CV CPX PATIENT IS MALE AGE 11-25: 0
OHS CV CPX PATIENT IS MALE AGE GREATER THAN 25: 1
OHS CV CPX PATIENT IS MALE AGE LESS THAN 11: 0
OHS CV CPX PATIENT IS MALE GREATER THAN 18: 1
OHS CV CPX PATIENT IS MALE LESS THAN OR EQUAL TO 18: 0
OHS CV CPX PATIENT IS MALE: 1
OHS CV CPX PATIENT WEIGHT RETURNED IN OZ: 3488
OHS CV CPX PEAK DIASTOLIC BLOOD PRESSURE: 78 MMHG
OHS CV CPX PEAK HEAR RATE: 93 BPM
OHS CV CPX PEAK RATE PRESSURE PRODUCT: NORMAL
OHS CV CPX PEAK SYSTOLIC BLOOD PRESSURE: 122 MMHG
OHS CV CPX PERCENT BODY FAT: 19.5
OHS CV CPX PERCENT MAX PREDICTED HEART RATE ACHIEVED: 56
OHS CV CPX PREDICTED VO2: 32.2 ML/KG/MIN
OHS CV CPX RATE PRESSURE PRODUCT PRESENTING: 6660
OHS CV CPX REST PET CO2: 30
OHS CV CPX VE/VCO2 SLOPE: 20
SARS-COV-2 RDRP RESP QL NAA+PROBE: NEGATIVE
STRESS ECHO POST EXERCISE DUR MIN: 9 MINUTES
STRESS ECHO POST EXERCISE DUR SEC: 31 SECONDS
SYSTOLIC BLOOD PRESSURE: 111 MMHG

## 2020-12-30 PROCEDURE — 99999 PR PBB SHADOW E&M-EST. PATIENT-LVL IV: CPT | Mod: PBBFAC,TXP,, | Performed by: INTERNAL MEDICINE

## 2020-12-30 PROCEDURE — 99214 OFFICE O/P EST MOD 30 MIN: CPT | Mod: PBBFAC,25,TXP | Performed by: INTERNAL MEDICINE

## 2020-12-30 PROCEDURE — 94621 CARDIOPULMONARY EXERCISE TESTING (CUPID ONLY): ICD-10-PCS | Mod: 26,TXP,, | Performed by: INTERNAL MEDICINE

## 2020-12-30 PROCEDURE — 94621 CARDIOPULM EXERCISE TESTING: CPT | Mod: 26,TXP,, | Performed by: INTERNAL MEDICINE

## 2020-12-30 PROCEDURE — U0002 COVID-19 LAB TEST NON-CDC: HCPCS | Mod: TXP

## 2020-12-30 PROCEDURE — 99215 OFFICE O/P EST HI 40 MIN: CPT | Mod: S$PBB,TXP,, | Performed by: INTERNAL MEDICINE

## 2020-12-30 PROCEDURE — 99215 PR OFFICE/OUTPT VISIT, EST, LEVL V, 40-54 MIN: ICD-10-PCS | Mod: S$PBB,TXP,, | Performed by: INTERNAL MEDICINE

## 2020-12-30 PROCEDURE — 94621 CARDIOPULM EXERCISE TESTING: CPT | Mod: TXP

## 2020-12-30 PROCEDURE — 99999 PR PBB SHADOW E&M-EST. PATIENT-LVL IV: ICD-10-PCS | Mod: PBBFAC,TXP,, | Performed by: INTERNAL MEDICINE

## 2020-12-30 RX ORDER — ISOSORBIDE DINITRATE AND HYDRALAZINE HYDROCHLORIDE 37.5; 2 MG/1; MG/1
1 TABLET ORAL 3 TIMES DAILY
Qty: 90 TABLET | Refills: 11 | Status: SHIPPED | OUTPATIENT
Start: 2020-12-30 | End: 2022-01-03 | Stop reason: ALTCHOICE

## 2020-12-30 NOTE — PROGRESS NOTES
Patient denies symptoms including cough, fevers, SOB, diarrhea, loss of taste or smell.   Informed patient of process for Covid test.  Patient denies prior nasal surgery. Pt verbalized permission to proceed with test.    Nasopharyngeal rapid specimen collected.  Patient tolerated procedure well.    Pt handed off to JOSE Koehler.

## 2020-12-30 NOTE — PROGRESS NOTES
"PCP: MADELINE Clifton Jr     Subjective:   Patient ID:  Jose Collado is a 55 y.o. male who presents for follow-up of Heart Transplant Pre-evaluation    54 year old male with history of ICM w LVEF 20% s/p PCI to LAD (June 2018) s/p AICD, HTN who comes for regular pre-HT follow-up. He has been stable from a HF standpoint, considers he has not worsened or improved since last visit. Denies orthopnea, edema, palpitations. He occasionally has some angina when he "overdoes it" lifting something. He tries to walk 2 miles daily except when it is too cold.    NYHA FC II. Peak VO2 increased from 13 to 15, and peak VO2 lean from 16 to 19.     No LDL available in our system. Last TTE one year ago.    He inquires how to manage his ASA and rivaroxaban whenever he gets a dental surgery in the near future.      Review of Systems   Constitution: Negative for chills and decreased appetite.   HENT: Negative for congestion, ear discharge and ear pain.    Eyes: Negative for blurred vision and discharge.   Cardiovascular: Positive for chest pain. Negative for claudication, cyanosis and dyspnea on exertion.   Respiratory: Negative for cough and hemoptysis.    Endocrine: Negative for cold intolerance and heat intolerance.   Skin: Negative for color change and nail changes.   Musculoskeletal: Negative for arthritis and back pain.   Gastrointestinal: Negative for bloating and abdominal pain.   Genitourinary: Negative for bladder incontinence and decreased libido.   Neurological: Negative for aphonia and brief paralysis.       Past Medical History:   Diagnosis Date    Acute hypoxemic respiratory failure     Acute kidney injury     Aneurysm     CHF (congestive heart failure)     Coronary artery disease     Diabetic amyotrophy associated with type 2 diabetes mellitus     Edema due to congestive heart failure     Fever     Heart failure     High blood pressure     History of respiratory failure     Hypertension     ICD (implantable " "cardioverter-defibrillator) in place     Inflammation of lung     Irregular heartbeat     SOB (shortness of breath)        Past Surgical History:   Procedure Laterality Date    atrial difibrillator  11/26/2018       Family History   Problem Relation Age of Onset    Hypertension Mother          Current Outpatient Medications:     aspirin (ECOTRIN) 81 MG EC tablet, Take 81 mg by mouth once daily. , Disp: , Rfl:     atorvastatin (LIPITOR) 80 MG tablet, TAKE 1 TABLET BY MOUTH EVERY DAY, Disp: 30 tablet, Rfl: 2    ENTRESTO  mg per tablet, TAKE 1 TABLET BY MOUTH TWICE A DAY, Disp: 60 tablet, Rfl: 2    furosemide (LASIX) 40 MG tablet, TAKE 1 TABLET BY MOUTH EVERY DAY, Disp: 30 tablet, Rfl: 3    glipiZIDE (GLUCOTROL) 5 MG tablet, TAKE 1 TABLET BY MOUTH DAILY, Disp: 30 tablet, Rfl: 1    metoprolol succinate (TOPROL-XL) 200 MG 24 hr tablet, TAKE 1 TABLET BY MOUTH EVERY DAY, Disp: 30 tablet, Rfl: 11    pantoprazole (PROTONIX) 40 MG tablet, TAKE 1 TABLET BY MOUTH EVERY DAY, Disp: 30 tablet, Rfl: 3    potassium chloride SA (K-DUR,KLOR-CON) 20 MEQ tablet, Take 40 mEq by mouth once daily. , Disp: , Rfl:     sertraline (ZOLOFT) 25 MG tablet, TAKE 1 TABLET BY MOUTH EVERY DAY, Disp: 30 tablet, Rfl: 1    spironolactone (ALDACTONE) 50 MG tablet, TAKE 1 TABLET BY MOUTH EVERY DAY, Disp: 30 tablet, Rfl: 2    traZODone (DESYREL) 50 MG tablet, TAKE 1 TABLET BY MOUTH ATBEDTIME, Disp: 30 tablet, Rfl: 1    XARELTO 20 mg Tab, TAKE 1 TABLET BY MOUTH ONCE DAILY WITH THE EVENING MEAL, Disp: 30 tablet, Rfl: 3    isosorbide-hydrALAZINE 20-37.5 mg (BIDIL) 20-37.5 mg Tab, Take 1 tablet by mouth 3 (three) times daily., Disp: 90 tablet, Rfl: 11  Objective:   /68 (BP Location: Left arm, Patient Position: Sitting, BP Method: Medium (Automatic))   Pulse 60   Ht 6' 5" (1.956 m)   Wt 99.4 kg (219 lb 2.2 oz)   BMI 25.99 kg/m²      Physical Exam   Constitutional: He is oriented to person, place, and time. He appears " well-developed and well-nourished.   HENT:   Head: Normocephalic and atraumatic.   Nose: Nose normal.   Mouth/Throat: No oropharyngeal exudate.   Eyes: Right eye exhibits no discharge. Left eye exhibits no discharge. No scleral icterus.   Neck: Normal range of motion. Neck supple. No JVD present.   Cardiovascular: Normal rate, regular rhythm, S1 normal and S2 normal. Exam reveals no gallop, no S3, no S4, no distant heart sounds, no friction rub, no midsystolic click and no opening snap.   No murmur heard.  Pulses:       Radial pulses are 2+ on the right side and 2+ on the left side.        Femoral pulses are 2+ on the right side and 2+ on the left side.  Weak heart sounds   Pulmonary/Chest: Effort normal and breath sounds normal. No respiratory distress. He has no wheezes. He has no rales. He exhibits no tenderness.   Abdominal: Soft. Bowel sounds are normal. He exhibits no distension. There is no abdominal tenderness. There is no rebound.   Musculoskeletal: Normal range of motion.         General: No tenderness, deformity or edema.   Lymphadenopathy:     He has no cervical adenopathy.   Neurological: He is alert and oriented to person, place, and time. No cranial nerve deficit.   Skin: Skin is warm and dry.   Psychiatric: He has a normal mood and affect. His behavior is normal.       Lab Results   Component Value Date     12/30/2020    K 4.7 12/30/2020     12/30/2020    CO2 31 (H) 12/30/2020    BUN 17 12/30/2020    CREATININE 1.4 12/30/2020    ANIONGAP 8 12/30/2020     No results found for: HGBA1C  Lab Results   Component Value Date     (H) 03/01/2019       Lab Results   Component Value Date    WBC 4.77 03/01/2019    HGB 13.2 (L) 03/01/2019    HCT 43.6 03/01/2019     03/01/2019    GRAN 2.2 03/01/2019    GRAN 45.8 03/01/2019     No results found for: CHOL, HDL, LDLCALC, TRIG     Assessment:     1. Chronic combined systolic and diastolic heart failure    2. ICD (implantable  cardioverter-defibrillator) in place    3. Left ventricular aneurysm    4. Mixed hyperlipidemia    5. Paroxysmal atrial fibrillation      He has been very stable from HF symptom standpoint, although his peak VO2 and peak VO2 lean have improved from 6 months ago. His home SBP is 120. He is not on bidil or SGLT2 inhibitor. There is no LDL in our system.    Plan:     -Start bidil - consider increasing the dose next visit if he tolerates vs. Starting SGLT2 inhibitor  -He says he does NOT take sildenafil or tadalafil - I counseled him about avoiding this medications during bidil use and to let us know if he plans to use them  -Lipid panel, CBC, CMP next visit  -TTE next visit  -CPX in 6 months      Agree with plan

## 2020-12-30 NOTE — LETTER
December 31, 2020        José Antonio Iraheta  [address] [place] [address]  Phone: [phone]  Fax: [phone]             [id number]  [address]  [place] [address]  Phone: [phone]   Patient: Jose Collado   MR Number: [id number]   YOB: 1965   Date of Visit: 12/30/2020       Dear Dr. José Antonio Iraheta    Thank you for referring Jose Collado to me for evaluation. Attached you will find relevant portions of my assessment and plan of care.    If you have questions, please do not hesitate to call me. I look forward to following Jose Collado along with you.    Sincerely,    Rufino Real MD    Enclosure    If you would like to receive this communication electronically, please contact [email or web address] or [phone] to request [employer] access.    [employer] is a tool which provides read-only access to select patient information with whom you have a relationship. Its easy to use and provides real time access to review your patients record including encounter summaries, notes, results, and demographic information.    If you feel you have received this communication in error or would no longer like to receive these types of communications, please e-mail [email or web address]

## 2021-01-08 ENCOUNTER — PATIENT MESSAGE (OUTPATIENT)
Dept: TRANSPLANT | Facility: CLINIC | Age: 56
End: 2021-01-08

## 2021-03-12 DIAGNOSIS — Z13.9 SCREENING FOR CONDITION: ICD-10-CM

## 2021-04-13 RX ORDER — PANTOPRAZOLE SODIUM 40 MG/1
TABLET, DELAYED RELEASE ORAL
Qty: 30 TABLET | Refills: 3 | Status: SHIPPED | OUTPATIENT
Start: 2021-04-13 | End: 2021-08-05

## 2021-04-13 RX ORDER — RIVAROXABAN 20 MG/1
TABLET, FILM COATED ORAL
Qty: 30 TABLET | Refills: 3 | Status: SHIPPED | OUTPATIENT
Start: 2021-04-13 | End: 2021-08-05

## 2021-04-14 ENCOUNTER — TELEPHONE (OUTPATIENT)
Dept: TRANSPLANT | Facility: CLINIC | Age: 56
End: 2021-04-14

## 2021-05-12 ENCOUNTER — PATIENT MESSAGE (OUTPATIENT)
Dept: RESEARCH | Facility: HOSPITAL | Age: 56
End: 2021-05-12

## 2021-06-25 ENCOUNTER — DOCUMENTATION ONLY (OUTPATIENT)
Dept: TRANSPLANT | Facility: CLINIC | Age: 56
End: 2021-06-25

## 2021-06-25 ENCOUNTER — PATIENT MESSAGE (OUTPATIENT)
Dept: TRANSPLANT | Facility: CLINIC | Age: 56
End: 2021-06-25

## 2021-06-25 ENCOUNTER — TELEPHONE (OUTPATIENT)
Dept: CARDIOLOGY | Facility: HOSPITAL | Age: 56
End: 2021-06-25

## 2021-06-25 ENCOUNTER — TELEPHONE (OUTPATIENT)
Dept: TRANSPLANT | Facility: CLINIC | Age: 56
End: 2021-06-25

## 2021-06-28 ENCOUNTER — HOSPITAL ENCOUNTER (OUTPATIENT)
Dept: CARDIOLOGY | Facility: HOSPITAL | Age: 56
Discharge: HOME | End: 2021-06-28
Attending: STUDENT IN AN ORGANIZED HEALTH CARE EDUCATION/TRAINING PROGRAM
Payer: MEDICARE

## 2021-06-28 ENCOUNTER — DOCUMENTATION ONLY (OUTPATIENT)
Dept: TRANSPLANT | Facility: CLINIC | Age: 56
End: 2021-06-28

## 2021-06-28 ENCOUNTER — OFFICE VISIT (OUTPATIENT)
Dept: TRANSPLANT | Facility: CLINIC | Age: 56
End: 2021-06-28
Attending: INTERNAL MEDICINE
Payer: MEDICARE

## 2021-06-28 ENCOUNTER — TELEPHONE (OUTPATIENT)
Dept: TRANSPLANT | Facility: CLINIC | Age: 56
End: 2021-06-28

## 2021-06-28 VITALS
SYSTOLIC BLOOD PRESSURE: 100 MMHG | HEIGHT: 77 IN | WEIGHT: 220 LBS | HEART RATE: 60 BPM | BODY MASS INDEX: 25.98 KG/M2 | DIASTOLIC BLOOD PRESSURE: 77 MMHG

## 2021-06-28 VITALS
SYSTOLIC BLOOD PRESSURE: 111 MMHG | BODY MASS INDEX: 26.95 KG/M2 | WEIGHT: 221.31 LBS | HEIGHT: 76 IN | DIASTOLIC BLOOD PRESSURE: 69 MMHG | HEART RATE: 60 BPM

## 2021-06-28 VITALS
WEIGHT: 219 LBS | DIASTOLIC BLOOD PRESSURE: 80 MMHG | HEART RATE: 60 BPM | SYSTOLIC BLOOD PRESSURE: 100 MMHG | HEIGHT: 77 IN | BODY MASS INDEX: 25.86 KG/M2

## 2021-06-28 DIAGNOSIS — I48.0 PAROXYSMAL ATRIAL FIBRILLATION: ICD-10-CM

## 2021-06-28 DIAGNOSIS — I25.10 CORONARY ARTERY DISEASE INVOLVING NATIVE CORONARY ARTERY OF NATIVE HEART WITHOUT ANGINA PECTORIS: ICD-10-CM

## 2021-06-28 DIAGNOSIS — I25.3 LEFT VENTRICULAR ANEURYSM: ICD-10-CM

## 2021-06-28 DIAGNOSIS — Z95.810 ICD (IMPLANTABLE CARDIOVERTER-DEFIBRILLATOR) IN PLACE: ICD-10-CM

## 2021-06-28 DIAGNOSIS — I50.42 CHRONIC COMBINED SYSTOLIC AND DIASTOLIC HEART FAILURE: Primary | ICD-10-CM

## 2021-06-28 DIAGNOSIS — I50.42 CHRONIC COMBINED SYSTOLIC AND DIASTOLIC HEART FAILURE: ICD-10-CM

## 2021-06-28 DIAGNOSIS — I50.20 SYSTOLIC CONGESTIVE HEART FAILURE, UNSPECIFIED HF CHRONICITY: Primary | ICD-10-CM

## 2021-06-28 DIAGNOSIS — I50.42 CHRONIC COMBINED SYSTOLIC AND DIASTOLIC CONGESTIVE HEART FAILURE: ICD-10-CM

## 2021-06-28 LAB
ASCENDING AORTA: 2.92 CM
AV INDEX (PROSTH): 0.8
AV MEAN GRADIENT: 3 MMHG
AV PEAK GRADIENT: 5 MMHG
AV VALVE AREA: 3.19 CM2
AV VELOCITY RATIO: 0.83
BSA FOR ECHO PROCEDURE: 2.32 M2
CV ECHO LV RWT: 0.24 CM
CV STRESS BASE HR: 60 BPM
DIASTOLIC BLOOD PRESSURE: 77 MMHG
DOP CALC AO PEAK VEL: 1.17 M/S
DOP CALC AO VTI: 26.12 CM
DOP CALC LVOT AREA: 4 CM2
DOP CALC LVOT DIAMETER: 2.25 CM
DOP CALC LVOT PEAK VEL: 0.97 M/S
DOP CALC LVOT STROKE VOLUME: 83.34 CM3
DOP CALCLVOT PEAK VEL VTI: 20.97 CM
E WAVE DECELERATION TIME: 336.92 MSEC
E/A RATIO: 0.68
E/E' RATIO: 7.29 M/S
ECHO LV POSTERIOR WALL: 0.7 CM (ref 0.6–1.1)
EJECTION FRACTION: 25 %
FRACTIONAL SHORTENING: 18 % (ref 28–44)
INTERVENTRICULAR SEPTUM: 0.78 CM (ref 0.6–1.1)
IVRT: 145.58 MSEC
LA MAJOR: 4.68 CM
LA MINOR: 4.67 CM
LA WIDTH: 3.8 CM
LEFT ATRIUM SIZE: 3.99 CM
LEFT ATRIUM VOLUME INDEX MOD: 13.5 ML/M2
LEFT ATRIUM VOLUME INDEX: 26 ML/M2
LEFT ATRIUM VOLUME MOD: 31.34 CM3
LEFT ATRIUM VOLUME: 60.25 CM3
LEFT INTERNAL DIMENSION IN SYSTOLE: 4.85 CM (ref 2.1–4)
LEFT VENTRICLE DIASTOLIC VOLUME INDEX: 75.77 ML/M2
LEFT VENTRICLE DIASTOLIC VOLUME: 175.78 ML
LEFT VENTRICLE MASS INDEX: 72 G/M2
LEFT VENTRICLE SYSTOLIC VOLUME INDEX: 47.5 ML/M2
LEFT VENTRICLE SYSTOLIC VOLUME: 110.11 ML
LEFT VENTRICULAR INTERNAL DIMENSION IN DIASTOLE: 5.94 CM (ref 3.5–6)
LEFT VENTRICULAR MASS: 166.11 G
LV LATERAL E/E' RATIO: 7.29 M/S
LV SEPTAL E/E' RATIO: 7.29 M/S
MV A" WAVE DURATION": 18.84 MSEC
MV PEAK A VEL: 0.75 M/S
MV PEAK E VEL: 0.51 M/S
MV STENOSIS PRESSURE HALF TIME: 97.71 MS
MV VALVE AREA P 1/2 METHOD: 2.25 CM2
OHS CV CPX 1 MINUTE RECOVERY HEART RATE: 82 BPM
OHS CV CPX 85 PERCENT MAX PREDICTED HEART RATE MALE: 141
OHS CV CPX ANAEROBIC THRESHOLD DIASTOLIC BLOOD PRESSURE: 70 MMHG
OHS CV CPX ANAEROBIC THRESHOLD HEART RATE: 74
OHS CV CPX ANAEROBIC THRESHOLD RATE PRESSURE PRODUCT: 7548
OHS CV CPX ANAEROBIC THRESHOLD SYSTOLIC BLOOD PRESSURE: 102
OHS CV CPX DATA GRADE - AT: 3.9
OHS CV CPX DATA GRADE - PEAK: 6.5
OHS CV CPX DATA SPEED - AT: 2.8
OHS CV CPX DATA SPEED - PEAK: 3.6
OHS CV CPX DATA TIME - AT: 6.03
OHS CV CPX DATA TIME - PEAK: 10
OHS CV CPX DATA VE/VCO2 - AT: 28
OHS CV CPX DATA VE/VCO2 - PEAK: 32
OHS CV CPX DATA VE/VO2 - AT: 24
OHS CV CPX DATA VE/VO2 - PEAK: 38
OHS CV CPX DATA VO2 - AT: 11.5
OHS CV CPX DATA VO2 - PEAK: 15.2
OHS CV CPX DATA VO2 - REST: 3
OHS CV CPX FEV1/FVC: 0.59
OHS CV CPX FORCED EXPIRATORY VOLUME: 2.17
OHS CV CPX FORCED VITAL CAPACITY (FVC): 3.66
OHS CV CPX HIGHEST VO: 36.7
OHS CV CPX MAX PREDICTED HEART RATE: 166
OHS CV CPX MAXIMAL VOLUNTARY VENTILATION (MVV) PREDICTED: 86.8
OHS CV CPX MAXIMAL VOLUNTARY VENTILATION (MVV): 68
OHS CV CPX MAXIUMUM EXERCISE VENTILATION (VE MAX): 53.8
OHS CV CPX PATIENT AGE: 54
OHS CV CPX PATIENT HEIGHT IN: 77
OHS CV CPX PATIENT IS FEMALE AGE 11-19: 0
OHS CV CPX PATIENT IS FEMALE AGE GREATER THAN 19: 0
OHS CV CPX PATIENT IS FEMALE AGE LESS THAN 11: 0
OHS CV CPX PATIENT IS FEMALE: 0
OHS CV CPX PATIENT IS MALE AGE 11-25: 0
OHS CV CPX PATIENT IS MALE AGE GREATER THAN 25: 1
OHS CV CPX PATIENT IS MALE AGE LESS THAN 11: 0
OHS CV CPX PATIENT IS MALE GREATER THAN 18: 1
OHS CV CPX PATIENT IS MALE LESS THAN OR EQUAL TO 18: 0
OHS CV CPX PATIENT IS MALE: 1
OHS CV CPX PATIENT WEIGHT RETURNED IN OZ: 3520
OHS CV CPX PEAK DIASTOLIC BLOOD PRESSURE: 66 MMHG
OHS CV CPX PEAK HEAR RATE: 93 BPM
OHS CV CPX PEAK RATE PRESSURE PRODUCT: 9858
OHS CV CPX PEAK SYSTOLIC BLOOD PRESSURE: 106 MMHG
OHS CV CPX PERCENT BODY FAT: 19
OHS CV CPX PERCENT MAX PREDICTED HEART RATE ACHIEVED: 56
OHS CV CPX PREDICTED VO2: 36.7 ML/KG/MIN
OHS CV CPX RATE PRESSURE PRODUCT PRESENTING: 6000
OHS CV CPX REST PET CO2: 32
OHS CV CPX VE/VCO2 SLOPE: 22.6
PISA TR MAX VEL: 2.92 M/S
PULM VEIN S/D RATIO: 1.16
PV PEAK D VEL: 0.37 M/S
PV PEAK S VEL: 0.43 M/S
QEF: 27 %
RA MAJOR: 4.6 CM
RA WIDTH: 3.87 CM
RIGHT VENTRICULAR END-DIASTOLIC DIMENSION: 3.74 CM
RV TISSUE DOPPLER FREE WALL SYSTOLIC VELOCITY 1 (APICAL 4 CHAMBER VIEW): 8.98 CM/S
SINUS: 2.73 CM
STJ: 2.8 CM
STRESS ECHO POST EXERCISE DUR MIN: 10 MINUTES
STRESS ECHO POST EXERCISE DUR SEC: 0 SECONDS
SYSTOLIC BLOOD PRESSURE: 100 MMHG
TDI LATERAL: 0.07 M/S
TDI SEPTAL: 0.07 M/S
TDI: 0.07 M/S
TR MAX PG: 34 MMHG
TRICUSPID ANNULAR PLANE SYSTOLIC EXCURSION: 1.89 CM

## 2021-06-28 PROCEDURE — 94621 CARDIOPULM EXERCISE TESTING: CPT | Mod: 26,,, | Performed by: INTERNAL MEDICINE

## 2021-06-28 PROCEDURE — 25500020 PHARM REV CODE 255: Mod: NTX | Performed by: STUDENT IN AN ORGANIZED HEALTH CARE EDUCATION/TRAINING PROGRAM

## 2021-06-28 PROCEDURE — 99999 PR PBB SHADOW E&M-EST. PATIENT-LVL IV: CPT | Mod: PBBFAC,TXP,, | Performed by: INTERNAL MEDICINE

## 2021-06-28 PROCEDURE — 1126F PR PAIN SEVERITY QUANTIFIED, NO PAIN PRESENT: ICD-10-PCS | Mod: S$GLB,,, | Performed by: INTERNAL MEDICINE

## 2021-06-28 PROCEDURE — 1126F AMNT PAIN NOTED NONE PRSNT: CPT | Mod: S$GLB,,, | Performed by: INTERNAL MEDICINE

## 2021-06-28 PROCEDURE — 3008F PR BODY MASS INDEX (BMI) DOCUMENTED: ICD-10-PCS | Mod: CPTII,S$GLB,, | Performed by: INTERNAL MEDICINE

## 2021-06-28 PROCEDURE — 99214 OFFICE O/P EST MOD 30 MIN: CPT | Mod: S$GLB,,, | Performed by: INTERNAL MEDICINE

## 2021-06-28 PROCEDURE — 93306 TTE W/DOPPLER COMPLETE: CPT | Mod: 26,,, | Performed by: INTERNAL MEDICINE

## 2021-06-28 PROCEDURE — 94621 CARDIOPULMONARY EXERCISE TESTING (CUPID ONLY): ICD-10-PCS | Mod: 26,,, | Performed by: INTERNAL MEDICINE

## 2021-06-28 PROCEDURE — 94621 CARDIOPULM EXERCISE TESTING: CPT | Mod: TXP

## 2021-06-28 PROCEDURE — 93306 ECHO (CUPID ONLY): ICD-10-PCS | Mod: 26,,, | Performed by: INTERNAL MEDICINE

## 2021-06-28 PROCEDURE — 99999 PR PBB SHADOW E&M-EST. PATIENT-LVL IV: ICD-10-PCS | Mod: PBBFAC,TXP,, | Performed by: INTERNAL MEDICINE

## 2021-06-28 PROCEDURE — 3008F BODY MASS INDEX DOCD: CPT | Mod: CPTII,S$GLB,, | Performed by: INTERNAL MEDICINE

## 2021-06-28 PROCEDURE — 99214 PR OFFICE/OUTPT VISIT, EST, LEVL IV, 30-39 MIN: ICD-10-PCS | Mod: S$GLB,,, | Performed by: INTERNAL MEDICINE

## 2021-06-28 PROCEDURE — C8929 TTE W OR WO FOL WCON,DOPPLER: HCPCS | Mod: NTX

## 2021-06-28 RX ORDER — COLCHICINE 0.6 MG/1
CAPSULE ORAL
COMMUNITY
Start: 2021-05-13

## 2021-06-28 RX ORDER — ALLOPURINOL 100 MG/1
TABLET ORAL
COMMUNITY
Start: 2021-06-11

## 2021-06-28 RX ORDER — DAPAGLIFLOZIN 5 MG/1
TABLET, FILM COATED ORAL
COMMUNITY
Start: 2021-01-05 | End: 2022-01-03 | Stop reason: ALTCHOICE

## 2021-06-28 RX ADMIN — HUMAN ALBUMIN MICROSPHERES AND PERFLUTREN 0.66 MG: 10; .22 INJECTION, SOLUTION INTRAVENOUS at 08:06

## 2021-10-18 ENCOUNTER — PATIENT MESSAGE (OUTPATIENT)
Dept: TRANSPLANT | Facility: CLINIC | Age: 56
End: 2021-10-18
Payer: MEDICARE

## 2022-01-03 ENCOUNTER — OFFICE VISIT (OUTPATIENT)
Dept: TRANSPLANT | Facility: CLINIC | Age: 57
End: 2022-01-03
Payer: MEDICARE

## 2022-01-03 ENCOUNTER — LAB VISIT (OUTPATIENT)
Dept: LAB | Facility: HOSPITAL | Age: 57
End: 2022-01-03
Attending: INTERNAL MEDICINE
Payer: MEDICARE

## 2022-01-03 VITALS
SYSTOLIC BLOOD PRESSURE: 142 MMHG | HEIGHT: 75 IN | HEART RATE: 59 BPM | BODY MASS INDEX: 27.9 KG/M2 | DIASTOLIC BLOOD PRESSURE: 90 MMHG | WEIGHT: 224.44 LBS

## 2022-01-03 DIAGNOSIS — E78.2 MIXED HYPERLIPIDEMIA: ICD-10-CM

## 2022-01-03 DIAGNOSIS — I50.42 CHRONIC COMBINED SYSTOLIC AND DIASTOLIC HEART FAILURE: ICD-10-CM

## 2022-01-03 DIAGNOSIS — I48.0 PAROXYSMAL ATRIAL FIBRILLATION: ICD-10-CM

## 2022-01-03 DIAGNOSIS — I25.10 CORONARY ARTERY DISEASE INVOLVING NATIVE CORONARY ARTERY OF NATIVE HEART WITHOUT ANGINA PECTORIS: ICD-10-CM

## 2022-01-03 DIAGNOSIS — Z95.810 ICD (IMPLANTABLE CARDIOVERTER-DEFIBRILLATOR) IN PLACE: ICD-10-CM

## 2022-01-03 DIAGNOSIS — I50.42 CHRONIC COMBINED SYSTOLIC AND DIASTOLIC CONGESTIVE HEART FAILURE: Primary | ICD-10-CM

## 2022-01-03 LAB
ALBUMIN SERPL BCP-MCNC: 3.9 G/DL (ref 3.5–5.2)
ALP SERPL-CCNC: 145 U/L (ref 55–135)
ALT SERPL W/O P-5'-P-CCNC: 16 U/L (ref 10–44)
ANION GAP SERPL CALC-SCNC: 9 MMOL/L (ref 8–16)
AST SERPL-CCNC: 23 U/L (ref 10–40)
BASOPHILS # BLD AUTO: 0.03 K/UL (ref 0–0.2)
BASOPHILS NFR BLD: 0.6 % (ref 0–1.9)
BILIRUB SERPL-MCNC: 1.5 MG/DL (ref 0.1–1)
BUN SERPL-MCNC: 12 MG/DL (ref 6–20)
CALCIUM SERPL-MCNC: 9.2 MG/DL (ref 8.7–10.5)
CHLORIDE SERPL-SCNC: 102 MMOL/L (ref 95–110)
CO2 SERPL-SCNC: 29 MMOL/L (ref 23–29)
CREAT SERPL-MCNC: 1 MG/DL (ref 0.5–1.4)
DIFFERENTIAL METHOD: ABNORMAL
EOSINOPHIL # BLD AUTO: 0.2 K/UL (ref 0–0.5)
EOSINOPHIL NFR BLD: 2.9 % (ref 0–8)
ERYTHROCYTE [DISTWIDTH] IN BLOOD BY AUTOMATED COUNT: 12.9 % (ref 11.5–14.5)
EST. GFR  (AFRICAN AMERICAN): >60 ML/MIN/1.73 M^2
EST. GFR  (NON AFRICAN AMERICAN): >60 ML/MIN/1.73 M^2
FERRITIN SERPL-MCNC: 162 NG/ML (ref 20–300)
GLUCOSE SERPL-MCNC: 120 MG/DL (ref 70–110)
HCT VFR BLD AUTO: 40.4 % (ref 40–54)
HGB BLD-MCNC: 13 G/DL (ref 14–18)
IMM GRANULOCYTES # BLD AUTO: 0 K/UL (ref 0–0.04)
IMM GRANULOCYTES NFR BLD AUTO: 0 % (ref 0–0.5)
IRON SERPL-MCNC: 32 UG/DL (ref 45–160)
LYMPHOCYTES # BLD AUTO: 1.3 K/UL (ref 1–4.8)
LYMPHOCYTES NFR BLD: 24.4 % (ref 18–48)
MCH RBC QN AUTO: 30.2 PG (ref 27–31)
MCHC RBC AUTO-ENTMCNC: 32.2 G/DL (ref 32–36)
MCV RBC AUTO: 94 FL (ref 82–98)
MONOCYTES # BLD AUTO: 1 K/UL (ref 0.3–1)
MONOCYTES NFR BLD: 18.3 % (ref 4–15)
NEUTROPHILS # BLD AUTO: 2.8 K/UL (ref 1.8–7.7)
NEUTROPHILS NFR BLD: 53.8 % (ref 38–73)
NRBC BLD-RTO: 0 /100 WBC
PLATELET # BLD AUTO: 91 K/UL (ref 150–450)
PMV BLD AUTO: 13.6 FL (ref 9.2–12.9)
POTASSIUM SERPL-SCNC: 4.3 MMOL/L (ref 3.5–5.1)
PROT SERPL-MCNC: 7.6 G/DL (ref 6–8.4)
RBC # BLD AUTO: 4.3 M/UL (ref 4.6–6.2)
SATURATED IRON: 9 % (ref 20–50)
SODIUM SERPL-SCNC: 140 MMOL/L (ref 136–145)
TOTAL IRON BINDING CAPACITY: 357 UG/DL (ref 250–450)
TRANSFERRIN SERPL-MCNC: 241 MG/DL (ref 200–375)
WBC # BLD AUTO: 5.2 K/UL (ref 3.9–12.7)

## 2022-01-03 PROCEDURE — 99999 PR PBB SHADOW E&M-EST. PATIENT-LVL IV: ICD-10-PCS | Mod: PBBFAC,,, | Performed by: INTERNAL MEDICINE

## 2022-01-03 PROCEDURE — 1159F PR MEDICATION LIST DOCUMENTED IN MEDICAL RECORD: ICD-10-PCS | Mod: CPTII,S$GLB,, | Performed by: INTERNAL MEDICINE

## 2022-01-03 PROCEDURE — 99999 PR PBB SHADOW E&M-EST. PATIENT-LVL IV: CPT | Mod: PBBFAC,,, | Performed by: INTERNAL MEDICINE

## 2022-01-03 PROCEDURE — 3077F SYST BP >= 140 MM HG: CPT | Mod: CPTII,S$GLB,, | Performed by: INTERNAL MEDICINE

## 2022-01-03 PROCEDURE — 99214 PR OFFICE/OUTPT VISIT, EST, LEVL IV, 30-39 MIN: ICD-10-PCS | Mod: S$GLB,,, | Performed by: INTERNAL MEDICINE

## 2022-01-03 PROCEDURE — 1159F MED LIST DOCD IN RCRD: CPT | Mod: CPTII,S$GLB,, | Performed by: INTERNAL MEDICINE

## 2022-01-03 PROCEDURE — 80053 COMPREHEN METABOLIC PANEL: CPT | Performed by: INTERNAL MEDICINE

## 2022-01-03 PROCEDURE — 82728 ASSAY OF FERRITIN: CPT | Performed by: INTERNAL MEDICINE

## 2022-01-03 PROCEDURE — 85025 COMPLETE CBC W/AUTO DIFF WBC: CPT | Performed by: INTERNAL MEDICINE

## 2022-01-03 PROCEDURE — 3080F PR MOST RECENT DIASTOLIC BLOOD PRESSURE >= 90 MM HG: ICD-10-PCS | Mod: CPTII,S$GLB,, | Performed by: INTERNAL MEDICINE

## 2022-01-03 PROCEDURE — 3080F DIAST BP >= 90 MM HG: CPT | Mod: CPTII,S$GLB,, | Performed by: INTERNAL MEDICINE

## 2022-01-03 PROCEDURE — 4010F ACE/ARB THERAPY RXD/TAKEN: CPT | Mod: CPTII,S$GLB,, | Performed by: INTERNAL MEDICINE

## 2022-01-03 PROCEDURE — 4010F PR ACE/ARB THEARPY RXD/TAKEN: ICD-10-PCS | Mod: CPTII,S$GLB,, | Performed by: INTERNAL MEDICINE

## 2022-01-03 PROCEDURE — 3008F BODY MASS INDEX DOCD: CPT | Mod: CPTII,S$GLB,, | Performed by: INTERNAL MEDICINE

## 2022-01-03 PROCEDURE — 99214 OFFICE O/P EST MOD 30 MIN: CPT | Mod: S$GLB,,, | Performed by: INTERNAL MEDICINE

## 2022-01-03 PROCEDURE — 36415 COLL VENOUS BLD VENIPUNCTURE: CPT | Performed by: INTERNAL MEDICINE

## 2022-01-03 PROCEDURE — 3008F PR BODY MASS INDEX (BMI) DOCUMENTED: ICD-10-PCS | Mod: CPTII,S$GLB,, | Performed by: INTERNAL MEDICINE

## 2022-01-03 PROCEDURE — 84466 ASSAY OF TRANSFERRIN: CPT | Performed by: INTERNAL MEDICINE

## 2022-01-03 PROCEDURE — 3077F PR MOST RECENT SYSTOLIC BLOOD PRESSURE >= 140 MM HG: ICD-10-PCS | Mod: CPTII,S$GLB,, | Performed by: INTERNAL MEDICINE

## 2022-01-03 RX ORDER — LOSARTAN POTASSIUM 50 MG/1
50 TABLET ORAL DAILY
Qty: 30 TABLET | Refills: 3 | Status: CANCELLED | OUTPATIENT
Start: 2022-01-03

## 2022-01-03 RX ORDER — METOPROLOL SUCCINATE 200 MG/1
200 TABLET, EXTENDED RELEASE ORAL DAILY
Qty: 30 TABLET | Refills: 11 | Status: SHIPPED | OUTPATIENT
Start: 2022-01-03 | End: 2022-07-11

## 2022-01-03 RX ORDER — CALCIUM CITRATE/VITAMIN D3 200MG-6.25
TABLET ORAL
COMMUNITY
Start: 2021-12-08

## 2022-01-03 RX ORDER — SACUBITRIL AND VALSARTAN 97; 103 MG/1; MG/1
1 TABLET, FILM COATED ORAL 2 TIMES DAILY
Qty: 60 TABLET | Refills: 2 | Status: SHIPPED | OUTPATIENT
Start: 2022-01-03 | End: 2022-03-09

## 2022-01-03 NOTE — PROGRESS NOTES
Advanced Heart Failure [facility] Transplantation Clinic Follow up.        Attending Physician: Osmin Mcpherson MD.  The patient's last visit with me was on 6/28/2021.         HPIAllie Collado is a 55 y.o. male who presents for follow-up of Heart Transplant Pre-evaluation     54 year old male with history of ICM w LVEF 20% s/p PCI to LAD (June 2018) s/p AICD, HTN who comes for regular follow-up. He has been stable from a HF standpoint, considers he has not had worsening of symptoms since last visit. Denies orthopnea, edema, palpitations.      Today he continues to do very well. He said he has not had ER visits or HF hospitalization since the beginning of 2020. He learned he had to adjust his diet and follow fluid restrictions in order to do better. He is now active. He walks 2 miles almost every day without limiting symptoms.      Review of Systems   Constitutional: Negative for activity change, chills, diaphoresis and fever.   HENT: Negative for nasal congestion, rhinorrhea and sore throat.    Eyes: Negative for visual disturbance.   Respiratory: Negative for chest tightness and shortness of breath.    Cardiovascular: Negative for chest pain, palpitations, leg swelling and claudication.   Gastrointestinal: Negative for abdominal distention, abdominal pain, diarrhea, nausea and vomiting.   Genitourinary: Negative for difficulty urinating, dysuria and hematuria.   Integumentary:  Negative for rash.   Neurological: Negative for seizures, syncope and light-headedness.   Psychiatric/Behavioral: Negative for agitation and hallucinations.        Past Medical History:   Diagnosis Date    Acute hypoxemic respiratory failure     Acute kidney injury     Aneurysm     CHF (congestive heart failure)     Coronary artery disease     Diabetic amyotrophy associated with type 2 diabetes mellitus     Edema due to congestive heart failure      Fever     Heart failure     High blood pressure     History of respiratory failure     Hypertension     ICD (implantable cardioverter-defibrillator) in place     Inflammation of lung     Irregular heartbeat     SOB (shortness of breath)         Past Surgical History:   Procedure Laterality Date    atrial difibrillator  11/26/2018        Family History   Problem Relation Age of Onset    Hypertension Mother         Review of patient's allergies indicates:  No Known Allergies     Current Outpatient Medications   Medication Instructions    allopurinoL (ZYLOPRIM) 100 MG tablet No dose, route, or frequency recorded.    aspirin (ECOTRIN) 81 mg, Oral, Daily    atorvastatin (LIPITOR) 80 MG tablet TAKE 1 TABLET BY MOUTH EVERY DAY    ENTRESTO  mg per tablet TAKE 1 TABLET BY MOUTH TWICE A DAY    FARXIGA 5 mg Tab tablet No dose, route, or frequency recorded.    furosemide (LASIX) 40 MG tablet TAKE 1 TABLET BY MOUTH EVERY DAY    glipiZIDE (GLUCOTROL) 5 MG tablet TAKE 1 TABLET BY MOUTH DAILY    isosorbide-hydrALAZINE 20-37.5 mg (BIDIL) 20-37.5 mg Tab 1 tablet, Oral, 3 times daily    losartan (COZAAR) 50 MG tablet TAKE 1 TABLET EVERY DAY    metoprolol succinate (TOPROL-XL) 200 MG 24 hr tablet TAKE 1 TABLET BY MOUTH EVERY DAY    MITIGARE 0.6 mg Cap No dose, route, or frequency recorded.    pantoprazole (PROTONIX) 40 MG tablet TAKE 1 TABLET BY MOUTH EVERY DAY    potassium chloride SA (K-DUR,KLOR-CON) 20 MEQ tablet 40 mEq, Oral, Daily    spironolactone (ALDACTONE) 50 MG tablet TAKE 1 TABLET BY MOUTH EVERY DAY    traZODone (DESYREL) 50 MG tablet TAKE 1 TABLET BY MOUTH ATBEDTIME    TRUE METRIX GLUCOSE TEST STRIP Strp No dose, route, or frequency recorded.    XARELTO 20 mg Tab TAKE 1 TABLET BY MOUTH ONCE DAILY WITH THE EVENING MEAL        Vitals:    01/03/22 0904   BP: (!) 142/90   Pulse: (!) 59        Wt Readings from Last 3 Encounters:   01/03/22 101.8 kg (224 lb 6.9 oz)   06/28/21 99.8 kg (220 lb)  "  06/28/21 100.4 kg (221 lb 5.5 oz)     Temp Readings from Last 3 Encounters:   No data found for Temp     BP Readings from Last 3 Encounters:   01/03/22 (!) 142/90   06/28/21 100/77   06/28/21 111/69     Pulse Readings from Last 3 Encounters:   01/03/22 (!) 59   06/28/21 60   06/28/21 60        Body mass index is 28.05 kg/m². Estimated body surface area is 2.32 meters squared as calculated from the following:    Height as of this encounter: 6' 3" (1.905 m).    Weight as of this encounter: 101.8 kg (224 lb 6.9 oz).     Physical Exam  Constitutional:       Appearance: He is well-developed and well-nourished.   HENT:      Head: Normocephalic and atraumatic.      Right Ear: External ear normal.      Left Ear: External ear normal.   Eyes:      Extraocular Movements: EOM normal.      Conjunctiva/sclera: Conjunctivae normal.      Pupils: Pupils are equal, round, and reactive to light.   Neck:      Vascular: No hepatojugular reflux or JVD.   Cardiovascular:      Rate and Rhythm: Normal rate and regular rhythm.      Pulses: Intact distal pulses.      Heart sounds: S1 normal and S2 normal. No murmur heard.  No friction rub. No gallop.    Pulmonary:      Effort: Pulmonary effort is normal.      Breath sounds: Normal breath sounds.   Abdominal:      General: Bowel sounds are normal. There is no distension.      Palpations: Abdomen is soft.      Tenderness: There is no abdominal tenderness. There is no guarding or rebound.   Musculoskeletal:         General: No edema.      Cervical back: Normal range of motion and neck supple.      Right lower leg: No edema.      Left lower leg: No edema.   Neurological:      Mental Status: He is alert and oriented to person, place, and time.      Deep Tendon Reflexes: Strength normal.          Lab Results   Component Value Date     (H) 03/01/2019     01/03/2022    K 4.3 01/03/2022     01/03/2022    CO2 29 01/03/2022    BUN 12 01/03/2022    CREATININE 1.0 01/03/2022    GLU " 120 (H) 01/03/2022    AST 23 01/03/2022    ALT 16 01/03/2022    ALBUMIN 3.9 01/03/2022    PROT 7.6 01/03/2022    BILITOT 1.5 (H) 01/03/2022    WBC 5.20 01/03/2022    HGB 13.0 (L) 01/03/2022    HCT 40.4 01/03/2022    PLT 91 (L) 01/03/2022    TSH 1.318 03/01/2019    CHOL 117 (L) 06/28/2021    HDL 30 (L) 06/28/2021    LDLCALC 74.4 06/28/2021    TRIG 63 06/28/2021           Results for orders placed during the hospital encounter of 06/28/21    Echo Color Flow Doppler? Yes    Interpretation Summary  · The left ventricle is mildly enlarged with severely decreased systolic function.  · There are segmental left ventricular wall motion abnormalities.  · The estimated ejection fraction is 25%.  · Grade I left ventricular diastolic dysfunction.  · Mild mitral regurgitation.  · Mild tricuspid regurgitation.  · Normal right ventricular size with normal right ventricular systolic function.  · The quantitatively derived ejection fraction is 27%.  · Indeterminate central venous pressure. Estimated PA systolic pressure is at least 34 mmHg.        No results found for this or any previous visit.         Assessment and Plan:  Chronic combined systolic and diastolic congestive heart failure  -     metoprolol succinate (TOPROL-XL) 200 MG 24 hr tablet; Take 1 tablet (200 mg total) by mouth once daily.  Dispense: 30 tablet; Refill: 11  -     CBC Auto Differential; Future; Expected date: 04/03/2022  -     Comprehensive Metabolic Panel; Future; Expected date: 04/03/2022  -     NT-Pro Natriuretic Peptide; Future; Expected date: 04/03/2022    Coronary artery disease involving native coronary artery of native heart without angina pectoris    ICD (implantable cardioverter-defibrillator) in place    Paroxysmal atrial fibrillation    Mixed hyperlipidemia    Other orders  -     sacubitriL-valsartan (ENTRESTO)  mg per tablet; Take 1 tablet by mouth 2 (two) times daily.  Dispense: 60 tablet; Refill: 2          1. Chronic systolic HF, NYHA  class I, stage C. S/p ICD.  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  Hemodynamic status: warm, euvolemic, normotensive.  Medications: optimal doses of metoprolol xl, entresto and spironolactone.  Furosemide 40 mg daily.       No changes on medications today. Stable. No HF ER visits or hospitalizations in almost 2 years.    2. HTN. Higher readings today but patient took a daytime cold remedy and was upset about ot being roomed on time. Claims normal BP at home. Patient will montor and report high Bps at home.    3. Atrial fibrillation. Paroxismal . On Xarelto.

## 2022-01-03 NOTE — PATIENT INSTRUCTIONS
1. You have just the right amount of fluid on you.  2. Please adhere to a low sodium diet (no more than 1.5 grams of sodium in 24h).  3.   Follow fluid restriction of  2. no more than 1.5 liters in 24 hours..  4. Please call us to confirm if you are taking losartan and entresto.  5. Monitor your BP at home and call us if is running high, > 130/80.  6. Follow up with PCP for low platelets.  7. Follow up in 6 months with labs.

## 2022-01-03 NOTE — LETTER
January 3, 2022        José Antonio Iraheta  [address] [facility] [address]  Phone: [phone]  Fax: [phone]             [facility]  [address]  [place] [address]  Phone: [phone]   Patient: Jose Collado   MR Number: [id number]   YOB: 1965   Date of Visit: 1/3/2022       Dear Dr. José Antonio Iraheta    Thank you for referring Jose Collado to me for evaluation. Attached you will find relevant portions of my assessment and plan of care.    If you have questions, please do not hesitate to call me. I look forward to following Jose Collado along with you.    Sincerely,    Osmin Mcpherson MD    Enclosure    If you would like to receive this communication electronically, please contact [email or web address] or [phone] to request [employer] access.    [employer] is a tool which provides read-only access to select patient information with whom you have a relationship. Its easy to use and provides real time access to review your patients record including encounter summaries, notes, results, and demographic information.    If you feel you have received this communication in error or would no longer like to receive these types of communications, please e-mail [email or web address]

## 2022-08-15 NOTE — PROGRESS NOTES
Advanced Heart Failure and Transplantation Clinic Follow up.        Attending Physician: Osmin Mcpherson MD.  The patient's last visit with me was on 1/3/2022.         HPIAllie Collado is a 55 y.o. male who presents for follow-up of Heart Transplant Pre-evaluation     54 year old male with history of ICM w LVEF 20% s/p PCI to LAD (June 2018) s/p AICD, HTN who comes for regular follow-up. He has been stable from a HF standpoint.     Today he continues to do very well. He said he has not had HF hospitalizations since the beginning of 2020.  Since last appointment he went to ER for a dull chest discomfort that lasted for 36 hours. They checked troponins, ECG and found no problems.  He was sent home. Since that episode, he has not had more discomfort.  He continues to be active. He walks 2 miles almost every day without limiting symptoms.  He feels tired if he has to lift a heavy object.       Review of Systems   Constitutional: Negative for activity change, appetite change, chills, diaphoresis, fatigue, fever and unexpected weight change.   HENT: Negative for nasal congestion, rhinorrhea and sore throat.    Eyes: Negative for visual disturbance.   Respiratory: Negative for apnea, cough, choking and shortness of breath.    Cardiovascular: Negative for chest pain.   Gastrointestinal: Negative for abdominal distention, abdominal pain, diarrhea, nausea and vomiting.   Genitourinary: Negative for difficulty urinating, dysuria and hematuria.   Integumentary:  Negative for rash.   Neurological: Negative for seizures, syncope and light-headedness.   Psychiatric/Behavioral: Negative for agitation and hallucinations.        Past Medical History:   Diagnosis Date    Acute hypoxemic respiratory failure     Acute kidney injury     Aneurysm     CHF (congestive heart failure)     Coronary artery disease     Diabetic amyotrophy associated  with type 2 diabetes mellitus     Edema due to congestive heart failure     Fever     Heart failure     High blood pressure     History of respiratory failure     Hypertension     ICD (implantable cardioverter-defibrillator) in place     Inflammation of lung     Irregular heartbeat     SOB (shortness of breath)         Past Surgical History:   Procedure Laterality Date    atrial difibrillator  11/26/2018        Family History   Problem Relation Age of Onset    Hypertension Mother         Review of patient's allergies indicates:  No Known Allergies     Current Outpatient Medications   Medication Instructions    allopurinoL (ZYLOPRIM) 100 MG tablet No dose, route, or frequency recorded.    aspirin (ECOTRIN) 81 mg, Oral, Daily    atorvastatin (LIPITOR) 80 mg, Oral, Daily    FARXIGA 10 mg, Oral, Daily    furosemide (LASIX) 40 mg, Oral, As needed (PRN)    metoprolol succinate (TOPROL-XL) 200 mg, Oral, Daily    MITIGARE 0.6 mg Cap No dose, route, or frequency recorded.    pantoprazole (PROTONIX) 40 MG tablet TAKE 1 TABLET BY MOUTH EVERY DAY    potassium chloride SA (K-DUR,KLOR-CON) 20 MEQ tablet 40 mEq, Oral, Daily    rivaroxaban (XARELTO) 20 mg, Oral, Daily    sacubitriL-valsartan (ENTRESTO)  mg per tablet 1 tablet, Oral, 2 times daily    spironolactone (ALDACTONE) 50 mg, Oral, Daily    traZODone (DESYREL) 50 MG tablet TAKE 1 TABLET BY MOUTH ATBEDTIME    TRUE METRIX GLUCOSE TEST STRIP Strp No dose, route, or frequency recorded.        Vitals:    08/16/22 0815   BP: 131/77   Pulse: 60        Wt Readings from Last 3 Encounters:   08/16/22 104.5 kg (230 lb 6.1 oz)   01/03/22 101.8 kg (224 lb 6.9 oz)   06/28/21 99.8 kg (220 lb)     Temp Readings from Last 3 Encounters:   No data found for Temp     BP Readings from Last 3 Encounters:   08/16/22 131/77   01/03/22 (!) 142/90   06/28/21 100/77     Pulse Readings from Last 3 Encounters:   08/16/22 60   01/03/22 (!) 59   06/28/21 60        Body mass  "index is 29.58 kg/m². Estimated body surface area is 2.34 meters squared as calculated from the following:    Height as of this encounter: 6' 2" (1.88 m).    Weight as of this encounter: 104.5 kg (230 lb 6.1 oz).     Physical Exam  Constitutional:       Appearance: He is well-developed.   HENT:      Head: Normocephalic and atraumatic.      Right Ear: External ear normal.      Left Ear: External ear normal.   Eyes:      Conjunctiva/sclera: Conjunctivae normal.      Pupils: Pupils are equal, round, and reactive to light.   Neck:      Vascular: No hepatojugular reflux or JVD.   Cardiovascular:      Rate and Rhythm: Normal rate and regular rhythm.      Pulses: Intact distal pulses.      Heart sounds: S1 normal and S2 normal. No murmur heard.    No friction rub. No gallop.   Pulmonary:      Effort: Pulmonary effort is normal.      Breath sounds: Normal breath sounds.   Abdominal:      General: Bowel sounds are normal. There is no distension.      Palpations: Abdomen is soft.      Tenderness: There is no abdominal tenderness. There is no guarding or rebound.   Musculoskeletal:      Cervical back: Normal range of motion and neck supple.      Right lower leg: No edema.      Left lower leg: No edema.   Neurological:      Mental Status: He is alert and oriented to person, place, and time.          Lab Results   Component Value Date     (H) 03/01/2019     08/16/2022    K 4.2 08/16/2022     08/16/2022    CO2 30 (H) 08/16/2022    BUN 17 08/16/2022    CREATININE 1.3 08/16/2022     (H) 08/16/2022    AST 22 08/16/2022    ALT 20 08/16/2022    ALBUMIN 4.1 08/16/2022    PROT 7.8 08/16/2022    BILITOT 1.9 (H) 08/16/2022    WBC 5.02 08/16/2022    HGB 13.4 (L) 08/16/2022    HCT 39.6 (L) 08/16/2022    PLT 87 (L) 08/16/2022    TSH 1.318 03/01/2019    CHOL 117 (L) 06/28/2021    HDL 30 (L) 06/28/2021    LDLCALC 74.4 06/28/2021    TRIG 63 06/28/2021       Results for orders placed during the hospital encounter of " 06/28/21    Echo Color Flow Doppler? Yes    Interpretation Summary  · The left ventricle is mildly enlarged with severely decreased systolic function.  · There are segmental left ventricular wall motion abnormalities.  · The estimated ejection fraction is 25%.  · Grade I left ventricular diastolic dysfunction.  · Mild mitral regurgitation.  · Mild tricuspid regurgitation.  · Normal right ventricular size with normal right ventricular systolic function.  · The quantitatively derived ejection fraction is 27%.  · Indeterminate central venous pressure. Estimated PA systolic pressure is at least 34 mmHg.        No results found for this or any previous visit.         Assessment and Plan:  Chronic combined systolic and diastolic congestive heart failure  -     Basic Metabolic Panel; Future; Expected date: 08/22/2022  -     CBC Auto Differential; Future; Expected date: 02/16/2023  -     Comprehensive Metabolic Panel; Future; Expected date: 02/16/2023  -     NT-Pro Natriuretic Peptide; Future; Expected date: 02/16/2023  -     Echo; Future; Expected date: 02/16/2023  -     metoprolol succinate (TOPROL-XL) 200 MG 24 hr tablet; Take 1 tablet (200 mg total) by mouth once daily.  Dispense: 30 tablet; Refill: 6  -     rivaroxaban (XARELTO) 20 mg Tab; Take 1 tablet (20 mg total) by mouth once daily.  Dispense: 30 tablet; Refill: 6    Coronary artery disease involving native coronary artery of native heart without angina pectoris    ICD (implantable cardioverter-defibrillator) in place    Paroxysmal atrial fibrillation    Mixed hyperlipidemia    Left ventricular aneurysm    Other orders  -     Discontinue: dapagliflozin (FARXIGA) 10 mg tablet; Take 1 tablet (10 mg total) by mouth once daily.  Dispense: 30 tablet; Refill: 5  -     sacubitriL-valsartan (ENTRESTO)  mg per tablet; Take 1 tablet by mouth 2 (two) times daily.  Dispense: 60 tablet; Refill: 6  -     furosemide (LASIX) 40 MG tablet; Take 1 tablet (40 mg total) by  mouth as needed (fluid retention).  Dispense: 30 tablet; Refill: 6  -     spironolactone (ALDACTONE) 50 MG tablet; Take 1 tablet (50 mg total) by mouth once daily.  Dispense: 30 tablet; Refill: 6  -     atorvastatin (LIPITOR) 80 MG tablet; Take 1 tablet (80 mg total) by mouth once daily.  Dispense: 30 tablet; Refill: 5  -     aspirin (ECOTRIN) 81 MG EC tablet; Take 1 tablet (81 mg total) by mouth once daily.  Dispense: 30 tablet; Refill: 6  -     dapagliflozin (FARXIGA) 10 mg tablet; Take 1 tablet (10 mg total) by mouth once daily.  Dispense: 30 tablet; Refill: 5             1. Chronic systolic HF, NYHA class I, stage C. S/p ICD.  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  Hemodynamic status: warm, euvolemic, normotensive.  Medications: optimal doses of metoprolol xl, entresto and spironolactone. Furosemide 40 mg daily.  Plan:  -start farxiga 10 mg daily.  -follow up BMP next Monday.  -Make furosemide 40 mg PRN   -follow up in 6 months with labs and echo          No changes on medications today. Stable. No HF ER visits or hospitalizations in almost 2 years.     2. HTN. Stable.   On medications as above.     3. Atrial fibrillation. Paroxismal . On Xarelto.    4. CAD s/p PCI 2020. Stable.  On statin, aspirin and HF as above.

## 2022-08-16 ENCOUNTER — LAB VISIT (OUTPATIENT)
Dept: LAB | Facility: HOSPITAL | Age: 57
End: 2022-08-16
Attending: INTERNAL MEDICINE
Payer: MEDICARE

## 2022-08-16 ENCOUNTER — OFFICE VISIT (OUTPATIENT)
Dept: TRANSPLANT | Facility: CLINIC | Age: 57
End: 2022-08-16
Payer: MEDICARE

## 2022-08-16 VITALS
HEIGHT: 74 IN | BODY MASS INDEX: 29.57 KG/M2 | WEIGHT: 230.38 LBS | SYSTOLIC BLOOD PRESSURE: 131 MMHG | DIASTOLIC BLOOD PRESSURE: 77 MMHG | HEART RATE: 60 BPM

## 2022-08-16 DIAGNOSIS — I50.42 CHRONIC COMBINED SYSTOLIC AND DIASTOLIC CONGESTIVE HEART FAILURE: Primary | ICD-10-CM

## 2022-08-16 DIAGNOSIS — I48.0 PAROXYSMAL ATRIAL FIBRILLATION: ICD-10-CM

## 2022-08-16 DIAGNOSIS — I25.3 LEFT VENTRICULAR ANEURYSM: ICD-10-CM

## 2022-08-16 DIAGNOSIS — I50.42 CHRONIC COMBINED SYSTOLIC AND DIASTOLIC CONGESTIVE HEART FAILURE: ICD-10-CM

## 2022-08-16 DIAGNOSIS — I25.10 CORONARY ARTERY DISEASE INVOLVING NATIVE CORONARY ARTERY OF NATIVE HEART WITHOUT ANGINA PECTORIS: ICD-10-CM

## 2022-08-16 DIAGNOSIS — E78.2 MIXED HYPERLIPIDEMIA: ICD-10-CM

## 2022-08-16 DIAGNOSIS — Z95.810 ICD (IMPLANTABLE CARDIOVERTER-DEFIBRILLATOR) IN PLACE: ICD-10-CM

## 2022-08-16 LAB
ALBUMIN SERPL BCP-MCNC: 4.1 G/DL (ref 3.5–5.2)
ALP SERPL-CCNC: 139 U/L (ref 55–135)
ALT SERPL W/O P-5'-P-CCNC: 20 U/L (ref 10–44)
ANION GAP SERPL CALC-SCNC: 9 MMOL/L (ref 8–16)
AST SERPL-CCNC: 22 U/L (ref 10–40)
BASOPHILS # BLD AUTO: 0.02 K/UL (ref 0–0.2)
BASOPHILS NFR BLD: 0.4 % (ref 0–1.9)
BILIRUB SERPL-MCNC: 1.9 MG/DL (ref 0.1–1)
BUN SERPL-MCNC: 17 MG/DL (ref 6–20)
CALCIUM SERPL-MCNC: 9.4 MG/DL (ref 8.7–10.5)
CHLORIDE SERPL-SCNC: 101 MMOL/L (ref 95–110)
CO2 SERPL-SCNC: 30 MMOL/L (ref 23–29)
CREAT SERPL-MCNC: 1.3 MG/DL (ref 0.5–1.4)
DIFFERENTIAL METHOD: ABNORMAL
EOSINOPHIL # BLD AUTO: 0.2 K/UL (ref 0–0.5)
EOSINOPHIL NFR BLD: 4.8 % (ref 0–8)
ERYTHROCYTE [DISTWIDTH] IN BLOOD BY AUTOMATED COUNT: 13.5 % (ref 11.5–14.5)
EST. GFR  (NO RACE VARIABLE): >60 ML/MIN/1.73 M^2
GLUCOSE SERPL-MCNC: 142 MG/DL (ref 70–110)
HCT VFR BLD AUTO: 39.6 % (ref 40–54)
HGB BLD-MCNC: 13.4 G/DL (ref 14–18)
IMM GRANULOCYTES # BLD AUTO: 0.01 K/UL (ref 0–0.04)
IMM GRANULOCYTES NFR BLD AUTO: 0.2 % (ref 0–0.5)
LYMPHOCYTES # BLD AUTO: 1.5 K/UL (ref 1–4.8)
LYMPHOCYTES NFR BLD: 29.9 % (ref 18–48)
MCH RBC QN AUTO: 31.7 PG (ref 27–31)
MCHC RBC AUTO-ENTMCNC: 33.8 G/DL (ref 32–36)
MCV RBC AUTO: 94 FL (ref 82–98)
MONOCYTES # BLD AUTO: 0.5 K/UL (ref 0.3–1)
MONOCYTES NFR BLD: 10.8 % (ref 4–15)
NEUTROPHILS # BLD AUTO: 2.7 K/UL (ref 1.8–7.7)
NEUTROPHILS NFR BLD: 53.9 % (ref 38–73)
NRBC BLD-RTO: 0 /100 WBC
PLATELET # BLD AUTO: 87 K/UL (ref 150–450)
PMV BLD AUTO: 12.8 FL (ref 9.2–12.9)
POTASSIUM SERPL-SCNC: 4.2 MMOL/L (ref 3.5–5.1)
PROT SERPL-MCNC: 7.8 G/DL (ref 6–8.4)
RBC # BLD AUTO: 4.23 M/UL (ref 4.6–6.2)
SODIUM SERPL-SCNC: 140 MMOL/L (ref 136–145)
WBC # BLD AUTO: 5.02 K/UL (ref 3.9–12.7)

## 2022-08-16 PROCEDURE — 83880 ASSAY OF NATRIURETIC PEPTIDE: CPT | Performed by: INTERNAL MEDICINE

## 2022-08-16 PROCEDURE — 4010F PR ACE/ARB THEARPY RXD/TAKEN: ICD-10-PCS | Mod: CPTII,S$GLB,, | Performed by: INTERNAL MEDICINE

## 2022-08-16 PROCEDURE — 36415 COLL VENOUS BLD VENIPUNCTURE: CPT | Performed by: INTERNAL MEDICINE

## 2022-08-16 PROCEDURE — 3078F PR MOST RECENT DIASTOLIC BLOOD PRESSURE < 80 MM HG: ICD-10-PCS | Mod: CPTII,S$GLB,, | Performed by: INTERNAL MEDICINE

## 2022-08-16 PROCEDURE — 99214 PR OFFICE/OUTPT VISIT, EST, LEVL IV, 30-39 MIN: ICD-10-PCS | Mod: S$GLB,,, | Performed by: INTERNAL MEDICINE

## 2022-08-16 PROCEDURE — 3078F DIAST BP <80 MM HG: CPT | Mod: CPTII,S$GLB,, | Performed by: INTERNAL MEDICINE

## 2022-08-16 PROCEDURE — 4010F ACE/ARB THERAPY RXD/TAKEN: CPT | Mod: CPTII,S$GLB,, | Performed by: INTERNAL MEDICINE

## 2022-08-16 PROCEDURE — 99214 OFFICE O/P EST MOD 30 MIN: CPT | Mod: S$GLB,,, | Performed by: INTERNAL MEDICINE

## 2022-08-16 PROCEDURE — 1159F MED LIST DOCD IN RCRD: CPT | Mod: CPTII,S$GLB,, | Performed by: INTERNAL MEDICINE

## 2022-08-16 PROCEDURE — 3008F PR BODY MASS INDEX (BMI) DOCUMENTED: ICD-10-PCS | Mod: CPTII,S$GLB,, | Performed by: INTERNAL MEDICINE

## 2022-08-16 PROCEDURE — 99999 PR PBB SHADOW E&M-EST. PATIENT-LVL III: ICD-10-PCS | Mod: PBBFAC,,, | Performed by: INTERNAL MEDICINE

## 2022-08-16 PROCEDURE — 80053 COMPREHEN METABOLIC PANEL: CPT | Performed by: INTERNAL MEDICINE

## 2022-08-16 PROCEDURE — 85025 COMPLETE CBC W/AUTO DIFF WBC: CPT | Performed by: INTERNAL MEDICINE

## 2022-08-16 PROCEDURE — 99999 PR PBB SHADOW E&M-EST. PATIENT-LVL III: CPT | Mod: PBBFAC,,, | Performed by: INTERNAL MEDICINE

## 2022-08-16 PROCEDURE — 3075F SYST BP GE 130 - 139MM HG: CPT | Mod: CPTII,S$GLB,, | Performed by: INTERNAL MEDICINE

## 2022-08-16 PROCEDURE — 3075F PR MOST RECENT SYSTOLIC BLOOD PRESS GE 130-139MM HG: ICD-10-PCS | Mod: CPTII,S$GLB,, | Performed by: INTERNAL MEDICINE

## 2022-08-16 PROCEDURE — 1159F PR MEDICATION LIST DOCUMENTED IN MEDICAL RECORD: ICD-10-PCS | Mod: CPTII,S$GLB,, | Performed by: INTERNAL MEDICINE

## 2022-08-16 PROCEDURE — 3008F BODY MASS INDEX DOCD: CPT | Mod: CPTII,S$GLB,, | Performed by: INTERNAL MEDICINE

## 2022-08-16 RX ORDER — ATORVASTATIN CALCIUM 80 MG/1
80 TABLET, FILM COATED ORAL DAILY
Qty: 30 TABLET | Refills: 5 | Status: SHIPPED | OUTPATIENT
Start: 2022-08-16 | End: 2023-01-31 | Stop reason: SDUPTHER

## 2022-08-16 RX ORDER — SPIRONOLACTONE 50 MG/1
50 TABLET, FILM COATED ORAL DAILY
Qty: 30 TABLET | Refills: 6 | Status: SHIPPED | OUTPATIENT
Start: 2022-08-16 | End: 2023-03-28 | Stop reason: SDUPTHER

## 2022-08-16 RX ORDER — DAPAGLIFLOZIN 10 MG/1
10 TABLET, FILM COATED ORAL DAILY
Qty: 30 TABLET | Refills: 5 | Status: SHIPPED | OUTPATIENT
Start: 2022-08-16 | End: 2022-08-16 | Stop reason: SDUPTHER

## 2022-08-16 RX ORDER — SACUBITRIL AND VALSARTAN 97; 103 MG/1; MG/1
1 TABLET, FILM COATED ORAL 2 TIMES DAILY
Qty: 60 TABLET | Refills: 6 | Status: SHIPPED | OUTPATIENT
Start: 2022-08-16 | End: 2022-10-03

## 2022-08-16 RX ORDER — METOPROLOL SUCCINATE 200 MG/1
200 TABLET, EXTENDED RELEASE ORAL DAILY
Qty: 30 TABLET | Refills: 6 | Status: SHIPPED | OUTPATIENT
Start: 2022-08-16 | End: 2023-03-28 | Stop reason: SDUPTHER

## 2022-08-16 RX ORDER — ASPIRIN 81 MG/1
81 TABLET ORAL DAILY
Qty: 30 TABLET | Refills: 6 | Status: SHIPPED | OUTPATIENT
Start: 2022-08-16 | End: 2023-03-28 | Stop reason: SDUPTHER

## 2022-08-16 RX ORDER — FUROSEMIDE 40 MG/1
40 TABLET ORAL
Qty: 30 TABLET | Refills: 6 | Status: SHIPPED | OUTPATIENT
Start: 2022-08-16 | End: 2023-03-28 | Stop reason: SDUPTHER

## 2022-08-16 RX ORDER — DAPAGLIFLOZIN 10 MG/1
10 TABLET, FILM COATED ORAL DAILY
Qty: 30 TABLET | Refills: 5 | Status: SHIPPED | OUTPATIENT
Start: 2022-08-16 | End: 2023-03-28 | Stop reason: SDUPTHER

## 2022-08-16 NOTE — PATIENT INSTRUCTIONS
You have just the right amount of fluid on you.  Please adhere to a low sodium diet (no more than 1.5 grams of sodium in 24h).  3.   Follow fluid restriction of  1. no more than 2 liters in 24 hours..   4. Please start taking FARXIGA 10 mg daily.  5. Follow up blood test on Monday.  6. Follow up in 6 months with labs and echocardiogram.  7. Only take furosemide 40 mg as needed now.

## 2022-08-16 NOTE — LETTER
August 16, 2022        José nAtonio Iraheta  [address] [facility] [address]  Phone: [phone]  Fax: [phone]             [facility]  [address]  [place] [address]  Phone: [phone]   Patient: Jose Collado   MR Number: [id number]   YOB: 1965   Date of Visit: 8/16/2022       Dear Dr. José Antonio Iraheta    Thank you for referring Jose Collado to me for evaluation. Attached you will find relevant portions of my assessment and plan of care.    If you have questions, please do not hesitate to call me. I look forward to following Jose Collado along with you.    Sincerely,    Osmin Mcpherson MD    Enclosure    If you would like to receive this communication electronically, please contact [email or web address] or [phone] to request [employer] Link access.    [employer] is a tool which provides read-only access to select patient information with whom you have a relationship. Its easy to use and provides real time access to review your patients record including encounter summaries, notes, results, and demographic information.    If you feel you have received this communication in error or would no longer like to receive these types of communications, please e-mail [email or web address]

## 2022-08-17 LAB — NT-PROBNP SERPL IA-MCNC: 176 PG/ML

## 2022-08-22 ENCOUNTER — LAB VISIT (OUTPATIENT)
Dept: LAB | Facility: HOSPITAL | Age: 57
End: 2022-08-22
Attending: INTERNAL MEDICINE
Payer: MEDICARE

## 2022-08-22 DIAGNOSIS — I50.42 CHRONIC COMBINED SYSTOLIC AND DIASTOLIC CONGESTIVE HEART FAILURE: ICD-10-CM

## 2022-08-22 LAB
ANION GAP SERPL CALC-SCNC: 5 MMOL/L (ref 8–16)
BUN SERPL-MCNC: 16 MG/DL (ref 6–20)
CALCIUM SERPL-MCNC: 9.1 MG/DL (ref 8.7–10.5)
CHLORIDE SERPL-SCNC: 101 MMOL/L (ref 95–110)
CO2 SERPL-SCNC: 29 MMOL/L (ref 23–29)
CREAT SERPL-MCNC: 1.2 MG/DL (ref 0.5–1.4)
EST. GFR  (NO RACE VARIABLE): >60 ML/MIN/1.73 M^2
GLUCOSE SERPL-MCNC: 238 MG/DL (ref 70–110)
POTASSIUM SERPL-SCNC: 4.4 MMOL/L (ref 3.5–5.1)
SODIUM SERPL-SCNC: 135 MMOL/L (ref 136–145)

## 2022-08-22 PROCEDURE — 80048 BASIC METABOLIC PNL TOTAL CA: CPT | Performed by: INTERNAL MEDICINE

## 2022-08-22 PROCEDURE — 36415 COLL VENOUS BLD VENIPUNCTURE: CPT | Mod: PO | Performed by: INTERNAL MEDICINE

## 2023-03-27 NOTE — PROGRESS NOTES
Advanced Heart Failure [facility] Transplantation Clinic Follow up.      Attending Physician: Osmin Mcpherson MD.  The patient's last visit with me was on 8/16/2022.         HPIAllie Collado is a 57 y.o. male who presents for follow-up of Heart Transplant Pre-evaluation     57 year old male with history of ICM w LVEF 20% s/p PCI to LAD (June 2018) s/p AICD, HTN who comes for regular follow-up. He has been stable from a HF standpoint.     Last appointment he was doing well. He did not have HF hospitalizations since the beginning of 2020.  He continues to be active.    March 28, 2023 he continues to do well. He walks 2 miles almost every day without limiting symptoms. He feels tired if he has to lift a heavy object.       Review of Systems   Constitutional:  Negative for activity change, appetite change, chills, diaphoresis, fatigue, fever and unexpected weight change.   HENT:  Negative for nasal congestion, rhinorrhea and sore throat.    Eyes:  Negative for visual disturbance.   Respiratory:  Negative for shortness of breath.    Cardiovascular:  Negative for chest pain and leg swelling.   Gastrointestinal:  Negative for abdominal distention, abdominal pain, diarrhea, nausea and vomiting.   Genitourinary:  Negative for difficulty urinating, dysuria and hematuria.   Integumentary:  Negative for rash.   Neurological:  Negative for seizures, syncope and light-headedness.   Psychiatric/Behavioral:  Negative for agitation and hallucinations.       Past Medical History:   Diagnosis Date    Acute hypoxemic respiratory failure     Acute kidney injury     Aneurysm     CHF (congestive heart failure)     Coronary artery disease     Diabetic amyotrophy associated with type 2 diabetes mellitus     Edema due to congestive heart failure     Fever     Heart failure     High blood pressure     History of respiratory failure     Hypertension     ICD  "(implantable cardioverter-defibrillator) in place     Inflammation of lung     Irregular heartbeat     SOB (shortness of breath)         Past Surgical History:   Procedure Laterality Date    atrial difibrillator  11/26/2018        Family History   Problem Relation Age of Onset    Hypertension Mother         Review of patient's allergies indicates:  No Known Allergies     Current Outpatient Medications   Medication Instructions    allopurinoL (ZYLOPRIM) 100 MG tablet No dose, route, or frequency recorded.    aspirin (ECOTRIN) 81 mg, Oral, Daily    atorvastatin (LIPITOR) 80 mg, Oral, Daily    FARXIGA 10 mg, Oral, Daily    furosemide (LASIX) 40 mg, Oral, As needed (PRN)    metoprolol succinate (TOPROL-XL) 200 mg, Oral, Daily    MITIGARE 0.6 mg Cap No dose, route, or frequency recorded.    pantoprazole (PROTONIX) 40 MG tablet TAKE 1 TABLET BY MOUTH EVERY DAY    potassium chloride SA (K-DUR,KLOR-CON) 20 MEQ tablet 40 mEq, Oral, Daily    sacubitriL-valsartan (ENTRESTO)  mg per tablet TAKE ONE TABLET BY MOUTH TWO TIMES A DAY    spironolactone (ALDACTONE) 50 mg, Oral, Daily    traZODone (DESYREL) 50 MG tablet TAKE 1 TABLET BY MOUTH ATBEDTIME    TRUE METRIX GLUCOSE TEST STRIP Strp No dose, route, or frequency recorded.    XARELTO 20 mg Tab TAKE 1 TABLET BY MOUTH ONCE DAILY WITH THE EVENING MEAL        There were no vitals filed for this visit.     Wt Readings from Last 3 Encounters:   08/16/22 104.5 kg (230 lb 6.1 oz)   01/03/22 101.8 kg (224 lb 6.9 oz)   06/28/21 99.8 kg (220 lb)     Temp Readings from Last 3 Encounters:   No data found for Temp     BP Readings from Last 3 Encounters:   08/16/22 131/77   01/03/22 (!) 142/90   06/28/21 100/77     Pulse Readings from Last 3 Encounters:   08/16/22 60   01/03/22 (!) 59   06/28/21 60        There is no height or weight on file to calculate BMI. Estimated body surface area is 2.34 meters squared as calculated from the following:    Height as of 8/16/22: 6' 2" (1.88 m).    " Weight as of 8/16/22: 104.5 kg (230 lb 6.1 oz).     Physical Exam  Constitutional:       Appearance: He is well-developed.   HENT:      Head: Normocephalic and atraumatic.      Right Ear: External ear normal.      Left Ear: External ear normal.   Eyes:      Conjunctiva/sclera: Conjunctivae normal.      Pupils: Pupils are equal, round, and reactive to light.   Neck:      Vascular: No hepatojugular reflux or JVD.   Cardiovascular:      Rate and Rhythm: Normal rate and regular rhythm.      Pulses: Intact distal pulses.      Heart sounds: S1 normal and S2 normal. No murmur heard.    No friction rub. No gallop.   Pulmonary:      Effort: Pulmonary effort is normal.      Breath sounds: Normal breath sounds.   Abdominal:      General: Bowel sounds are normal. There is no distension.      Palpations: Abdomen is soft.      Tenderness: There is no abdominal tenderness. There is no guarding or rebound.   Musculoskeletal:      Cervical back: Normal range of motion and neck supple.      Right lower leg: No edema.      Left lower leg: No edema.   Neurological:      Mental Status: He is alert and oriented to person, place, and time.        Lab Results   Component Value Date     (H) 03/01/2019     (L) 08/22/2022    K 4.4 08/22/2022     08/22/2022    CO2 29 08/22/2022    BUN 16 08/22/2022    CREATININE 1.2 08/22/2022     (H) 08/22/2022    AST 22 08/16/2022    ALT 20 08/16/2022    ALBUMIN 4.1 08/16/2022    PROT 7.8 08/16/2022    BILITOT 1.9 (H) 08/16/2022    WBC 5.02 08/16/2022    HGB 13.4 (L) 08/16/2022    HCT 39.6 (L) 08/16/2022    PLT 87 (L) 08/16/2022    TSH 1.318 03/01/2019    CHOL 117 (L) 06/28/2021    HDL 30 (L) 06/28/2021    LDLCALC 74.4 06/28/2021    TRIG 63 06/28/2021         Results for orders placed during the hospital encounter of 06/28/21    Echo Color Flow Doppler? Yes    Interpretation Summary  · The left ventricle is mildly enlarged with severely decreased systolic function.  · There are  segmental left ventricular wall motion abnormalities.  · The estimated ejection fraction is 25%.  · Grade I left ventricular diastolic dysfunction.  · Mild mitral regurgitation.  · Mild tricuspid regurgitation.  · Normal right ventricular size with normal right ventricular systolic function.  · The quantitatively derived ejection fraction is 27%.  · Indeterminate central venous pressure. Estimated PA systolic pressure is at least 34 mmHg.        No results found for this or any previous visit.         Assessment and Plan:  Chronic combined systolic and diastolic congestive heart failure  -     metoprolol succinate (TOPROL-XL) 200 MG 24 hr tablet; Take 1 tablet (200 mg total) by mouth once daily.  Dispense: 30 tablet; Refill: 6  -     CBC Auto Differential; Future; Expected date: 06/28/2023  -     Comprehensive Metabolic Panel; Future; Expected date: 06/28/2023  -     NT-Pro Natriuretic Peptide; Future; Expected date: 06/28/2023  -     Lipid panel; Future; Expected date: 06/28/2023    Coronary artery disease involving native coronary artery of native heart without angina pectoris    ICD (implantable cardioverter-defibrillator) in place    Mixed hyperlipidemia    Paroxysmal atrial fibrillation    Other orders  -     aspirin (ECOTRIN) 81 MG EC tablet; Take 1 tablet (81 mg total) by mouth once daily.  Dispense: 30 tablet; Refill: 6  -     atorvastatin (LIPITOR) 80 MG tablet; Take 1 tablet (80 mg total) by mouth once daily.  Dispense: 30 tablet; Refill: 5  -     dapagliflozin (FARXIGA) 10 mg tablet; Take 1 tablet (10 mg total) by mouth once daily.  Dispense: 30 tablet; Refill: 5  -     sacubitriL-valsartan (ENTRESTO)  mg per tablet; Take 1 tablet by mouth 2 (two) times daily.  Dispense: 60 tablet; Refill: 5  -     spironolactone (ALDACTONE) 50 MG tablet; Take 1 tablet (50 mg total) by mouth once daily.  Dispense: 30 tablet; Refill: 6  -     furosemide (LASIX) 40 MG tablet; Take 1 tablet (40 mg total) by mouth once  daily.  Dispense: 30 tablet; Refill: 6          1. Chronic systolic HF, NYHA class I, stage C. S/p ICD. LVEF 25% in 2021.  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  Hemodynamic status: warm, euvolemic, normotensive.  Medications: optimal doses of metoprolol xl, entresto, spironolactone, farxiga. Furosemide 40 mg daily.  Plan:  -no changes on medications today.  -follow up in 4 months with labs         No HF ER visits or hospitalizations in 3 years.     2. HTN. Stable.   On medications as above.     3. Atrial fibrillation. Paroxismal . On Xarelto.     4. CAD s/p PCI 2020. Stable.  On statin, aspirin and HF as above.

## 2023-03-28 ENCOUNTER — HOSPITAL ENCOUNTER (OUTPATIENT)
Dept: CARDIOLOGY | Facility: HOSPITAL | Age: 58
Discharge: HOME | End: 2023-03-28
Attending: INTERNAL MEDICINE
Payer: MEDICARE

## 2023-03-28 ENCOUNTER — OFFICE VISIT (OUTPATIENT)
Dept: TRANSPLANT | Facility: CLINIC | Age: 58
End: 2023-03-28
Payer: MEDICARE

## 2023-03-28 VITALS
BODY MASS INDEX: 29.57 KG/M2 | WEIGHT: 230.38 LBS | HEIGHT: 74 IN | SYSTOLIC BLOOD PRESSURE: 132 MMHG | DIASTOLIC BLOOD PRESSURE: 82 MMHG | DIASTOLIC BLOOD PRESSURE: 85 MMHG | HEIGHT: 76 IN | SYSTOLIC BLOOD PRESSURE: 144 MMHG | HEART RATE: 65 BPM | HEART RATE: 59 BPM | BODY MASS INDEX: 27.79 KG/M2 | WEIGHT: 228.19 LBS

## 2023-03-28 DIAGNOSIS — Z95.810 ICD (IMPLANTABLE CARDIOVERTER-DEFIBRILLATOR) IN PLACE: ICD-10-CM

## 2023-03-28 DIAGNOSIS — I25.10 CORONARY ARTERY DISEASE INVOLVING NATIVE CORONARY ARTERY OF NATIVE HEART WITHOUT ANGINA PECTORIS: ICD-10-CM

## 2023-03-28 DIAGNOSIS — E78.2 MIXED HYPERLIPIDEMIA: ICD-10-CM

## 2023-03-28 DIAGNOSIS — I48.0 PAROXYSMAL ATRIAL FIBRILLATION: ICD-10-CM

## 2023-03-28 DIAGNOSIS — I50.42 CHRONIC COMBINED SYSTOLIC AND DIASTOLIC CONGESTIVE HEART FAILURE: ICD-10-CM

## 2023-03-28 DIAGNOSIS — I50.42 CHRONIC COMBINED SYSTOLIC AND DIASTOLIC CONGESTIVE HEART FAILURE: Primary | ICD-10-CM

## 2023-03-28 LAB
ASCENDING AORTA: 2.89 CM
AV INDEX (PROSTH): 0.83
AV MEAN GRADIENT: 2 MMHG
AV PEAK GRADIENT: 4 MMHG
AV VALVE AREA: 2.81 CM2
AV VELOCITY RATIO: 0.79
BSA FOR ECHO PROCEDURE: 2.34 M2
CV ECHO LV RWT: 0.24 CM
DOP CALC AO PEAK VEL: 1.04 M/S
DOP CALC AO VTI: 19.03 CM
DOP CALC LVOT AREA: 3.4 CM2
DOP CALC LVOT DIAMETER: 2.08 CM
DOP CALC LVOT PEAK VEL: 0.82 M/S
DOP CALC LVOT STROKE VOLUME: 53.49 CM3
DOP CALCLVOT PEAK VEL VTI: 15.75 CM
E WAVE DECELERATION TIME: 318.99 MSEC
E/A RATIO: 0.67
E/E' RATIO: 6.67 M/S
ECHO LV POSTERIOR WALL: 0.71 CM (ref 0.6–1.1)
EJECTION FRACTION: 23 %
FRACTIONAL SHORTENING: 27 % (ref 28–44)
INTERVENTRICULAR SEPTUM: 0.67 CM (ref 0.6–1.1)
LA MAJOR: 5.44 CM
LA MINOR: 5.41 CM
LA WIDTH: 4.26 CM
LEFT ATRIUM SIZE: 4.41 CM
LEFT ATRIUM VOLUME INDEX MOD: 31 ML/M2
LEFT ATRIUM VOLUME INDEX: 37 ML/M2
LEFT ATRIUM VOLUME MOD: 72.46 CM3
LEFT ATRIUM VOLUME: 86.63 CM3
LEFT INTERNAL DIMENSION IN SYSTOLE: 4.32 CM (ref 2.1–4)
LEFT VENTRICLE DIASTOLIC VOLUME INDEX: 74.38 ML/M2
LEFT VENTRICLE DIASTOLIC VOLUME: 174.06 ML
LEFT VENTRICLE MASS INDEX: 65 G/M2
LEFT VENTRICLE SYSTOLIC VOLUME INDEX: 35.9 ML/M2
LEFT VENTRICLE SYSTOLIC VOLUME: 84.03 ML
LEFT VENTRICULAR INTERNAL DIMENSION IN DIASTOLE: 5.91 CM (ref 3.5–6)
LEFT VENTRICULAR MASS: 151.19 G
LV LATERAL E/E' RATIO: 5.71 M/S
LV SEPTAL E/E' RATIO: 8 M/S
MV A" WAVE DURATION": 9.13 MSEC
MV PEAK A VEL: 0.6 M/S
MV PEAK E VEL: 0.4 M/S
MV STENOSIS PRESSURE HALF TIME: 92.51 MS
MV VALVE AREA P 1/2 METHOD: 2.38 CM2
PISA TR MAX VEL: 2.45 M/S
PULM VEIN S/D RATIO: 1.23
PV PEAK D VEL: 0.35 M/S
PV PEAK S VEL: 0.43 M/S
RA MAJOR: 5.06 CM
RA PRESSURE: 3 MMHG
RA WIDTH: 4.36 CM
RIGHT VENTRICULAR END-DIASTOLIC DIMENSION: 4.09 CM
RV TISSUE DOPPLER FREE WALL SYSTOLIC VELOCITY 1 (APICAL 4 CHAMBER VIEW): 9.49 CM/S
SINUS: 3.04 CM
STJ: 2.5 CM
TDI LATERAL: 0.07 M/S
TDI SEPTAL: 0.05 M/S
TDI: 0.06 M/S
TR MAX PG: 24 MMHG
TRICUSPID ANNULAR PLANE SYSTOLIC EXCURSION: 1.72 CM
TV REST PULMONARY ARTERY PRESSURE: 27 MMHG

## 2023-03-28 PROCEDURE — 93306 TTE W/DOPPLER COMPLETE: CPT | Mod: 26,,, | Performed by: INTERNAL MEDICINE

## 2023-03-28 PROCEDURE — 1159F MED LIST DOCD IN RCRD: CPT | Mod: CPTII,S$GLB,, | Performed by: INTERNAL MEDICINE

## 2023-03-28 PROCEDURE — 99999 PR PBB SHADOW E&M-EST. PATIENT-LVL IV: CPT | Mod: PBBFAC,,, | Performed by: INTERNAL MEDICINE

## 2023-03-28 PROCEDURE — 3079F DIAST BP 80-89 MM HG: CPT | Mod: CPTII,S$GLB,, | Performed by: INTERNAL MEDICINE

## 2023-03-28 PROCEDURE — 3079F PR MOST RECENT DIASTOLIC BLOOD PRESSURE 80-89 MM HG: ICD-10-PCS | Mod: CPTII,S$GLB,, | Performed by: INTERNAL MEDICINE

## 2023-03-28 PROCEDURE — 99999 PR PBB SHADOW E&M-EST. PATIENT-LVL IV: ICD-10-PCS | Mod: PBBFAC,,, | Performed by: INTERNAL MEDICINE

## 2023-03-28 PROCEDURE — 1159F PR MEDICATION LIST DOCUMENTED IN MEDICAL RECORD: ICD-10-PCS | Mod: CPTII,S$GLB,, | Performed by: INTERNAL MEDICINE

## 2023-03-28 PROCEDURE — 3075F PR MOST RECENT SYSTOLIC BLOOD PRESS GE 130-139MM HG: ICD-10-PCS | Mod: CPTII,S$GLB,, | Performed by: INTERNAL MEDICINE

## 2023-03-28 PROCEDURE — 3008F PR BODY MASS INDEX (BMI) DOCUMENTED: ICD-10-PCS | Mod: CPTII,S$GLB,, | Performed by: INTERNAL MEDICINE

## 2023-03-28 PROCEDURE — 99214 PR OFFICE/OUTPT VISIT, EST, LEVL IV, 30-39 MIN: ICD-10-PCS | Mod: S$GLB,,, | Performed by: INTERNAL MEDICINE

## 2023-03-28 PROCEDURE — C8929 TTE W OR WO FOL WCON,DOPPLER: HCPCS

## 2023-03-28 PROCEDURE — 4010F ACE/ARB THERAPY RXD/TAKEN: CPT | Mod: CPTII,S$GLB,, | Performed by: INTERNAL MEDICINE

## 2023-03-28 PROCEDURE — 3075F SYST BP GE 130 - 139MM HG: CPT | Mod: CPTII,S$GLB,, | Performed by: INTERNAL MEDICINE

## 2023-03-28 PROCEDURE — 99214 OFFICE O/P EST MOD 30 MIN: CPT | Mod: S$GLB,,, | Performed by: INTERNAL MEDICINE

## 2023-03-28 PROCEDURE — 1160F PR REVIEW ALL MEDS BY PRESCRIBER/CLIN PHARMACIST DOCUMENTED: ICD-10-PCS | Mod: CPTII,S$GLB,, | Performed by: INTERNAL MEDICINE

## 2023-03-28 PROCEDURE — 25500020 PHARM REV CODE 255: Performed by: INTERNAL MEDICINE

## 2023-03-28 PROCEDURE — 1160F RVW MEDS BY RX/DR IN RCRD: CPT | Mod: CPTII,S$GLB,, | Performed by: INTERNAL MEDICINE

## 2023-03-28 PROCEDURE — 4010F PR ACE/ARB THEARPY RXD/TAKEN: ICD-10-PCS | Mod: CPTII,S$GLB,, | Performed by: INTERNAL MEDICINE

## 2023-03-28 PROCEDURE — 3008F BODY MASS INDEX DOCD: CPT | Mod: CPTII,S$GLB,, | Performed by: INTERNAL MEDICINE

## 2023-03-28 PROCEDURE — 93306 ECHO (CUPID ONLY): ICD-10-PCS | Mod: 26,,, | Performed by: INTERNAL MEDICINE

## 2023-03-28 RX ORDER — ATORVASTATIN CALCIUM 80 MG/1
80 TABLET, FILM COATED ORAL DAILY
Qty: 30 TABLET | Refills: 5 | Status: SHIPPED | OUTPATIENT
Start: 2023-03-28 | End: 2023-08-14 | Stop reason: SDUPTHER

## 2023-03-28 RX ORDER — METOPROLOL SUCCINATE 200 MG/1
200 TABLET, EXTENDED RELEASE ORAL DAILY
Qty: 30 TABLET | Refills: 6 | Status: SHIPPED | OUTPATIENT
Start: 2023-03-28 | End: 2023-09-29

## 2023-03-28 RX ORDER — SPIRONOLACTONE 50 MG/1
50 TABLET, FILM COATED ORAL DAILY
Qty: 30 TABLET | Refills: 6 | Status: SHIPPED | OUTPATIENT
Start: 2023-03-28 | End: 2023-08-14 | Stop reason: SDUPTHER

## 2023-03-28 RX ORDER — LANCETS 30 GAUGE
EACH MISCELLANEOUS
COMMUNITY
Start: 2022-10-18

## 2023-03-28 RX ORDER — DAPAGLIFLOZIN 10 MG/1
10 TABLET, FILM COATED ORAL DAILY
Qty: 30 TABLET | Refills: 5 | Status: SHIPPED | OUTPATIENT
Start: 2023-03-28 | End: 2023-08-14 | Stop reason: SDUPTHER

## 2023-03-28 RX ORDER — ASPIRIN 81 MG/1
81 TABLET ORAL DAILY
Qty: 30 TABLET | Refills: 6 | Status: SHIPPED | OUTPATIENT
Start: 2023-03-28

## 2023-03-28 RX ORDER — FUROSEMIDE 40 MG/1
40 TABLET ORAL DAILY
Qty: 30 TABLET | Refills: 6 | Status: SHIPPED | OUTPATIENT
Start: 2023-03-28 | End: 2023-08-14 | Stop reason: SDUPTHER

## 2023-03-28 RX ORDER — SACUBITRIL AND VALSARTAN 97; 103 MG/1; MG/1
1 TABLET, FILM COATED ORAL 2 TIMES DAILY
Qty: 60 TABLET | Refills: 5 | Status: SHIPPED | OUTPATIENT
Start: 2023-03-28 | End: 2023-08-28

## 2023-03-28 RX ADMIN — HUMAN ALBUMIN MICROSPHERES AND PERFLUTREN 0.66 MG: 10; .22 INJECTION, SOLUTION INTRAVENOUS at 11:03

## 2023-03-28 NOTE — LETTER
March 28, 2023        José Antonio Iraheta  [address] [facility] [address]  Phone: [phone]  Fax: [phone]             [facility]  [address]  [place] [address]  Phone: [phone]   Patient: Jose Collado   MR Number: [id number]   YOB: 1965   Date of Visit: 3/28/2023       Dear Dr. José Antonio Iraheta    Thank you for referring Jose Collado to me for evaluation. Attached you will find relevant portions of my assessment and plan of care.    If you have questions, please do not hesitate to call me. I look forward to following Jose Collado along with you.    Sincerely,    Osmin Mcpherson MD    Enclosure    If you would like to receive this communication electronically, please contact [email or web address] or [phone] to request [employer] Link access.    [employer] Link is a tool which provides read-only access to select patient information with whom you have a relationship. Its easy to use and provides real time access to review your patients record including encounter summaries, notes, results, and demographic information.    If you feel you have received this communication in error or would no longer like to receive these types of communications, please e-mail [email or web address]

## 2023-03-28 NOTE — PATIENT INSTRUCTIONS
You have just the right amount of fluid on you.  Please adhere to a low sodium diet (no more than 1.5 grams of sodium in 24h).  3.   Follow fluid restriction of  1. no more than 2 liters in 24 hours..   4. No changes on medications today.  5. Follow up in 4 months with labs.

## 2023-04-25 ENCOUNTER — PATIENT MESSAGE (OUTPATIENT)
Dept: RESEARCH | Facility: HOSPITAL | Age: 58
End: 2023-04-25
Payer: MEDICARE

## 2023-04-29 DIAGNOSIS — I50.42 CHRONIC COMBINED SYSTOLIC AND DIASTOLIC CONGESTIVE HEART FAILURE: ICD-10-CM

## 2023-05-01 RX ORDER — RIVAROXABAN 20 MG/1
TABLET, FILM COATED ORAL
Qty: 30 TABLET | Refills: 3 | Status: SHIPPED | OUTPATIENT
Start: 2023-05-01 | End: 2023-08-04

## 2023-05-02 ENCOUNTER — PATIENT MESSAGE (OUTPATIENT)
Dept: RESEARCH | Facility: HOSPITAL | Age: 58
End: 2023-05-02
Payer: MEDICARE

## 2023-08-04 DIAGNOSIS — I50.42 CHRONIC COMBINED SYSTOLIC AND DIASTOLIC CONGESTIVE HEART FAILURE: ICD-10-CM

## 2023-08-04 RX ORDER — RIVAROXABAN 20 MG/1
TABLET, FILM COATED ORAL
Qty: 30 TABLET | Refills: 3 | Status: SHIPPED | OUTPATIENT
Start: 2023-08-04 | End: 2023-12-06

## 2023-08-14 ENCOUNTER — LAB VISIT (OUTPATIENT)
Dept: LAB | Facility: HOSPITAL | Age: 58
End: 2023-08-14
Attending: INTERNAL MEDICINE
Payer: MEDICARE

## 2023-08-14 ENCOUNTER — OFFICE VISIT (OUTPATIENT)
Dept: TRANSPLANT | Facility: CLINIC | Age: 58
End: 2023-08-14
Payer: MEDICARE

## 2023-08-14 VITALS
HEART RATE: 60 BPM | DIASTOLIC BLOOD PRESSURE: 87 MMHG | HEIGHT: 75 IN | BODY MASS INDEX: 27.55 KG/M2 | SYSTOLIC BLOOD PRESSURE: 124 MMHG | WEIGHT: 221.56 LBS

## 2023-08-14 DIAGNOSIS — I50.42 CHRONIC COMBINED SYSTOLIC AND DIASTOLIC CONGESTIVE HEART FAILURE: ICD-10-CM

## 2023-08-14 DIAGNOSIS — I48.0 PAROXYSMAL ATRIAL FIBRILLATION: ICD-10-CM

## 2023-08-14 DIAGNOSIS — Z95.810 ICD (IMPLANTABLE CARDIOVERTER-DEFIBRILLATOR) IN PLACE: ICD-10-CM

## 2023-08-14 DIAGNOSIS — I25.10 CORONARY ARTERY DISEASE INVOLVING NATIVE CORONARY ARTERY OF NATIVE HEART WITHOUT ANGINA PECTORIS: ICD-10-CM

## 2023-08-14 DIAGNOSIS — I50.42 CHRONIC COMBINED SYSTOLIC AND DIASTOLIC CONGESTIVE HEART FAILURE: Primary | ICD-10-CM

## 2023-08-14 LAB
ALBUMIN SERPL BCP-MCNC: 4 G/DL (ref 3.5–5.2)
ALP SERPL-CCNC: 151 U/L (ref 55–135)
ALT SERPL W/O P-5'-P-CCNC: 20 U/L (ref 10–44)
ANION GAP SERPL CALC-SCNC: 10 MMOL/L (ref 8–16)
AST SERPL-CCNC: 23 U/L (ref 10–40)
BASOPHILS # BLD AUTO: 0.02 K/UL (ref 0–0.2)
BASOPHILS NFR BLD: 0.4 % (ref 0–1.9)
BILIRUB SERPL-MCNC: 1.6 MG/DL (ref 0.1–1)
BUN SERPL-MCNC: 16 MG/DL (ref 6–20)
CALCIUM SERPL-MCNC: 9 MG/DL (ref 8.7–10.5)
CHLORIDE SERPL-SCNC: 103 MMOL/L (ref 95–110)
CHOLEST SERPL-MCNC: 129 MG/DL (ref 120–199)
CHOLEST/HDLC SERPL: 3.9 {RATIO} (ref 2–5)
CO2 SERPL-SCNC: 28 MMOL/L (ref 23–29)
CREAT SERPL-MCNC: 1.2 MG/DL (ref 0.5–1.4)
DIFFERENTIAL METHOD: ABNORMAL
EOSINOPHIL # BLD AUTO: 0.2 K/UL (ref 0–0.5)
EOSINOPHIL NFR BLD: 3.7 % (ref 0–8)
ERYTHROCYTE [DISTWIDTH] IN BLOOD BY AUTOMATED COUNT: 13.8 % (ref 11.5–14.5)
EST. GFR  (NO RACE VARIABLE): >60 ML/MIN/1.73 M^2
GLUCOSE SERPL-MCNC: 107 MG/DL (ref 70–110)
HCT VFR BLD AUTO: 46.3 % (ref 40–54)
HDLC SERPL-MCNC: 33 MG/DL (ref 40–75)
HDLC SERPL: 25.6 % (ref 20–50)
HGB BLD-MCNC: 15 G/DL (ref 14–18)
IMM GRANULOCYTES # BLD AUTO: 0.02 K/UL (ref 0–0.04)
IMM GRANULOCYTES NFR BLD AUTO: 0.4 % (ref 0–0.5)
LDLC SERPL CALC-MCNC: 79.2 MG/DL (ref 63–159)
LYMPHOCYTES # BLD AUTO: 1.6 K/UL (ref 1–4.8)
LYMPHOCYTES NFR BLD: 28.7 % (ref 18–48)
MCH RBC QN AUTO: 30.3 PG (ref 27–31)
MCHC RBC AUTO-ENTMCNC: 32.4 G/DL (ref 32–36)
MCV RBC AUTO: 94 FL (ref 82–98)
MONOCYTES # BLD AUTO: 0.5 K/UL (ref 0.3–1)
MONOCYTES NFR BLD: 9.6 % (ref 4–15)
NEUTROPHILS # BLD AUTO: 3.2 K/UL (ref 1.8–7.7)
NEUTROPHILS NFR BLD: 57.2 % (ref 38–73)
NONHDLC SERPL-MCNC: 96 MG/DL
NRBC BLD-RTO: 0 /100 WBC
PLATELET # BLD AUTO: 103 K/UL (ref 150–450)
PMV BLD AUTO: 13.2 FL (ref 9.2–12.9)
POTASSIUM SERPL-SCNC: 4.2 MMOL/L (ref 3.5–5.1)
PROT SERPL-MCNC: 8 G/DL (ref 6–8.4)
RBC # BLD AUTO: 4.95 M/UL (ref 4.6–6.2)
SODIUM SERPL-SCNC: 141 MMOL/L (ref 136–145)
TRIGL SERPL-MCNC: 84 MG/DL (ref 30–150)
WBC # BLD AUTO: 5.64 K/UL (ref 3.9–12.7)

## 2023-08-14 PROCEDURE — 99214 PR OFFICE/OUTPT VISIT, EST, LEVL IV, 30-39 MIN: ICD-10-PCS | Mod: S$GLB,,, | Performed by: INTERNAL MEDICINE

## 2023-08-14 PROCEDURE — 1159F MED LIST DOCD IN RCRD: CPT | Mod: CPTII,S$GLB,, | Performed by: INTERNAL MEDICINE

## 2023-08-14 PROCEDURE — 4010F PR ACE/ARB THEARPY RXD/TAKEN: ICD-10-PCS | Mod: CPTII,S$GLB,, | Performed by: INTERNAL MEDICINE

## 2023-08-14 PROCEDURE — 3008F PR BODY MASS INDEX (BMI) DOCUMENTED: ICD-10-PCS | Mod: CPTII,S$GLB,, | Performed by: INTERNAL MEDICINE

## 2023-08-14 PROCEDURE — 80053 COMPREHEN METABOLIC PANEL: CPT | Performed by: INTERNAL MEDICINE

## 2023-08-14 PROCEDURE — 3074F SYST BP LT 130 MM HG: CPT | Mod: CPTII,S$GLB,, | Performed by: INTERNAL MEDICINE

## 2023-08-14 PROCEDURE — 99214 OFFICE O/P EST MOD 30 MIN: CPT | Mod: S$GLB,,, | Performed by: INTERNAL MEDICINE

## 2023-08-14 PROCEDURE — 3079F PR MOST RECENT DIASTOLIC BLOOD PRESSURE 80-89 MM HG: ICD-10-PCS | Mod: CPTII,S$GLB,, | Performed by: INTERNAL MEDICINE

## 2023-08-14 PROCEDURE — 3008F BODY MASS INDEX DOCD: CPT | Mod: CPTII,S$GLB,, | Performed by: INTERNAL MEDICINE

## 2023-08-14 PROCEDURE — 80061 LIPID PANEL: CPT | Performed by: INTERNAL MEDICINE

## 2023-08-14 PROCEDURE — 85025 COMPLETE CBC W/AUTO DIFF WBC: CPT | Performed by: INTERNAL MEDICINE

## 2023-08-14 PROCEDURE — 99999 PR PBB SHADOW E&M-EST. PATIENT-LVL IV: CPT | Mod: PBBFAC,,, | Performed by: INTERNAL MEDICINE

## 2023-08-14 PROCEDURE — 4010F ACE/ARB THERAPY RXD/TAKEN: CPT | Mod: CPTII,S$GLB,, | Performed by: INTERNAL MEDICINE

## 2023-08-14 PROCEDURE — 99999 PR PBB SHADOW E&M-EST. PATIENT-LVL IV: ICD-10-PCS | Mod: PBBFAC,,, | Performed by: INTERNAL MEDICINE

## 2023-08-14 PROCEDURE — 3074F PR MOST RECENT SYSTOLIC BLOOD PRESSURE < 130 MM HG: ICD-10-PCS | Mod: CPTII,S$GLB,, | Performed by: INTERNAL MEDICINE

## 2023-08-14 PROCEDURE — 1159F PR MEDICATION LIST DOCUMENTED IN MEDICAL RECORD: ICD-10-PCS | Mod: CPTII,S$GLB,, | Performed by: INTERNAL MEDICINE

## 2023-08-14 PROCEDURE — 83880 ASSAY OF NATRIURETIC PEPTIDE: CPT | Performed by: INTERNAL MEDICINE

## 2023-08-14 PROCEDURE — 3079F DIAST BP 80-89 MM HG: CPT | Mod: CPTII,S$GLB,, | Performed by: INTERNAL MEDICINE

## 2023-08-14 PROCEDURE — 36415 COLL VENOUS BLD VENIPUNCTURE: CPT | Performed by: INTERNAL MEDICINE

## 2023-08-14 RX ORDER — SPIRONOLACTONE 50 MG/1
50 TABLET, FILM COATED ORAL DAILY
Qty: 30 TABLET | Refills: 6 | Status: SHIPPED | OUTPATIENT
Start: 2023-08-14

## 2023-08-14 RX ORDER — DAPAGLIFLOZIN 10 MG/1
10 TABLET, FILM COATED ORAL DAILY
Qty: 30 TABLET | Refills: 5 | Status: SHIPPED | OUTPATIENT
Start: 2023-08-14 | End: 2024-03-26

## 2023-08-14 RX ORDER — FUROSEMIDE 40 MG/1
40 TABLET ORAL DAILY
Qty: 30 TABLET | Refills: 6 | Status: SHIPPED | OUTPATIENT
Start: 2023-08-14 | End: 2024-02-26

## 2023-08-14 RX ORDER — ATORVASTATIN CALCIUM 80 MG/1
80 TABLET, FILM COATED ORAL DAILY
Qty: 30 TABLET | Refills: 5 | Status: SHIPPED | OUTPATIENT
Start: 2023-08-14 | End: 2024-02-26

## 2023-08-14 NOTE — PATIENT INSTRUCTIONS
You have just the right amount of fluid on you.  Please adhere to a low sodium diet (no more than 1.5 grams of sodium in 24h).  3.   Follow fluid restriction of  1. no more than 2 liters in 24 hours..   4.  No changes on medications today.

## 2023-08-14 NOTE — LETTER
August 14, 2023        José Antonio Iraheta  [address] [facility] [address]  Phone: [phone]  Fax: [phone]             [facility]  [address]  [place] [address]  Phone: [phone]   Patient: Jose Collado   MR Number: [id number]   YOB: 1965   Date of Visit: 8/14/2023       Dear Dr. José Antonio Iraheta    Thank you for referring Jose Collado to me for evaluation. Attached you will find relevant portions of my assessment and plan of care.    If you have questions, please do not hesitate to call me. I look forward to following Jose Collado along with you.    Sincerely,    Osmin Mcpherson MD    Enclosure    If you would like to receive this communication electronically, please contact [email or web address] or [phone] to request [employer] Link access.    [employer] Link is a tool which provides read-only access to select patient information with whom you have a relationship. Its easy to use and provides real time access to review your patients record including encounter summaries, notes, results, and demographic information.    If you feel you have received this communication in error or would no longer like to receive these types of communications, please e-mail [email or web address]

## 2023-08-14 NOTE — PROGRESS NOTES
Advanced Heart Failure and Transplantation Clinic Follow up.        Attending Physician: Osmin Mcpherson MD.  The patient's last visit with me was on 3/28/2023.         HPIAllie Collado is a 57 y.o. male who presents for follow-up.     57 year old male with history of ICM w LVEF 20% s/p PCI to LAD (June 2018) s/p AICD, HTN who comes for regular follow-up. He has been stable from a HF standpoint.     Last appointment he was doing well. He did not have HF hospitalizations since the beginning of 2020.  He continues to be active.     March 28, 2023 he continues to do well. He walks 2 miles almost every day without limiting symptoms. He feels tired if he has to lift a heavy object.    Today August 14, 2023, patient comes with family. He continues to feel well. He is happy to report he has not had any ER visits or hospitalizations since last appointment and since early 2020. He continue to do exercise. He increased his walking distance from 2 miles to 3 miles. He denies congestive symptoms. He is not limited on daily activities.       Review of Systems   Constitutional:  Negative for activity change, appetite change, chills, diaphoresis, fatigue and fever.   HENT:  Negative for nasal congestion, rhinorrhea and sore throat.    Eyes:  Negative for visual disturbance.   Respiratory:  Negative for chest tightness and shortness of breath.    Cardiovascular:  Negative for chest pain, palpitations and leg swelling.   Gastrointestinal:  Negative for abdominal distention, abdominal pain, diarrhea, nausea and vomiting.   Genitourinary:  Negative for difficulty urinating, dysuria and hematuria.   Integumentary:  Negative for rash.   Neurological:  Negative for seizures, syncope and light-headedness.   Psychiatric/Behavioral:  Negative for agitation and hallucinations.         Past Medical History:   Diagnosis Date    Acute hypoxemic  respiratory failure     Acute kidney injury     Aneurysm     CHF (congestive heart failure)     Coronary artery disease     Diabetic amyotrophy associated with type 2 diabetes mellitus     Edema due to congestive heart failure     Fever     Heart failure     High blood pressure     History of respiratory failure     Hypertension     ICD (implantable cardioverter-defibrillator) in place     Inflammation of lung     Irregular heartbeat     SOB (shortness of breath)         Past Surgical History:   Procedure Laterality Date    atrial difibrillator  11/26/2018        Family History   Problem Relation Age of Onset    Hypertension Mother         Review of patient's allergies indicates:  No Known Allergies     Current Outpatient Medications   Medication Instructions    allopurinoL (ZYLOPRIM) 100 MG tablet No dose, route, or frequency recorded.    aspirin (ECOTRIN) 81 mg, Oral, Daily    atorvastatin (LIPITOR) 80 mg, Oral, Daily    EASY TOUCH TWIST LANCETS 30 gauge Misc No dose, route, or frequency recorded.    FARXIGA 10 mg, Oral, Daily    furosemide (LASIX) 40 mg, Oral, Daily    metoprolol succinate (TOPROL-XL) 200 mg, Oral, Daily    MITIGARE 0.6 mg Cap No dose, route, or frequency recorded.    pantoprazole (PROTONIX) 40 MG tablet TAKE 1 TABLET BY MOUTH EVERY DAY    potassium chloride SA (K-DUR,KLOR-CON) 20 MEQ tablet 40 mEq, Oral, Daily    sacubitriL-valsartan (ENTRESTO)  mg per tablet 1 tablet, Oral, 2 times daily    spironolactone (ALDACTONE) 50 mg, Oral, Daily    traZODone (DESYREL) 50 MG tablet TAKE 1 TABLET BY MOUTH ATBEDTIME    TRUE METRIX GLUCOSE TEST STRIP Strp No dose, route, or frequency recorded.    XARELTO 20 mg Tab TAKE 1 TABLET BY MOUTH ONCE DAILY WITH THE EVENING MEAL        Vitals:    08/14/23 1035   BP: 124/87   Pulse: 60        Wt Readings from Last 3 Encounters:   08/14/23 100.5 kg (221 lb 9 oz)   03/28/23 104.5 kg (230 lb 6.1 oz)   03/28/23 103.5 kg (228 lb 2.8 oz)     Temp Readings from Last 3  "Encounters:   No data found for Temp     BP Readings from Last 3 Encounters:   08/14/23 124/87   03/28/23 (!) 144/82   03/28/23 132/85     Pulse Readings from Last 3 Encounters:   08/14/23 60   03/28/23 65   03/28/23 (!) 59        Body mass index is 27.69 kg/m². Estimated body surface area is 2.31 meters squared as calculated from the following:    Height as of this encounter: 6' 3" (1.905 m).    Weight as of this encounter: 100.5 kg (221 lb 9 oz).     Physical Exam  Constitutional:       Appearance: He is well-developed.   HENT:      Head: Normocephalic and atraumatic.      Right Ear: External ear normal.      Left Ear: External ear normal.   Eyes:      Conjunctiva/sclera: Conjunctivae normal.      Pupils: Pupils are equal, round, and reactive to light.   Neck:      Vascular: No hepatojugular reflux or JVD.   Cardiovascular:      Rate and Rhythm: Normal rate and regular rhythm.      Pulses: Intact distal pulses.      Heart sounds: S1 normal and S2 normal. No murmur heard.     No friction rub. No gallop.   Pulmonary:      Effort: Pulmonary effort is normal.      Breath sounds: Normal breath sounds.   Abdominal:      General: Bowel sounds are normal. There is no distension.      Palpations: Abdomen is soft.      Tenderness: There is no abdominal tenderness. There is no guarding or rebound.   Musculoskeletal:      Cervical back: Normal range of motion and neck supple.      Right lower leg: No edema.      Left lower leg: No edema.   Neurological:      Mental Status: He is alert and oriented to person, place, and time.          Lab Results   Component Value Date     (H) 03/01/2019     08/14/2023    K 4.2 08/14/2023     08/14/2023    CO2 28 08/14/2023    BUN 16 08/14/2023    CREATININE 1.2 08/14/2023     08/14/2023    AST 23 08/14/2023    ALT 20 08/14/2023    ALBUMIN 4.0 08/14/2023    PROT 8.0 08/14/2023    BILITOT 1.6 (H) 08/14/2023    WBC 5.64 08/14/2023    HGB 15.0 08/14/2023    HCT 46.3 " 08/14/2023     (L) 08/14/2023    TSH 1.318 03/01/2019    CHOL 129 08/14/2023    HDL 33 (L) 08/14/2023    LDLCALC 79.2 08/14/2023    TRIG 84 08/14/2023       @LABRCNTIP(cpk,cpkmb,troponini,mb)@     No results found for this visit on 08/14/23.       Results for orders placed during the hospital encounter of 03/28/23    Echo    Interpretation Summary  · The left ventricle is mildly enlarged with severely decreased systolic function.The estimated ejection fraction is 20-25% with marked RWMAa and large area of akinesis.  · A small solid left ventricular thrombus is possible. The thrombus is fixed and located in the apex.  · There are segmental left ventricular wall motion abnormalities.  · Grade I left ventricular diastolic dysfunction.  · Biatrial enlargement  · Mild right ventricular enlargement with low normal right ventricular systolic function.  · Mild mitral regurgitation.  · Mild tricuspid regurgitation.  · Normal central venous pressure (3 mmHg).  · The estimated PA systolic pressure is 27 mmHg.        No results found for this or any previous visit.         Assessment and Plan:  Chronic combined systolic and diastolic congestive heart failure  -     CPX Study; Future; Expected date: 08/15/2023  -     CBC Auto Differential; Future; Expected date: 12/11/2023  -     Comprehensive Metabolic Panel; Future; Expected date: 12/11/2023  -     NT-Pro Natriuretic Peptide; Future; Expected date: 12/11/2023    Coronary artery disease involving native coronary artery of native heart without angina pectoris    ICD (implantable cardioverter-defibrillator) in place    Paroxysmal atrial fibrillation    Other orders  -     atorvastatin (LIPITOR) 80 MG tablet; Take 1 tablet (80 mg total) by mouth once daily.  Dispense: 30 tablet; Refill: 5  -     dapagliflozin propanediol (FARXIGA) 10 mg tablet; Take 1 tablet (10 mg total) by mouth once daily.  Dispense: 30 tablet; Refill: 5  -     furosemide (LASIX) 40 MG tablet; Take 1  tablet (40 mg total) by mouth once daily.  Dispense: 30 tablet; Refill: 6  -     spironolactone (ALDACTONE) 50 MG tablet; Take 1 tablet (50 mg total) by mouth once daily.  Dispense: 30 tablet; Refill: 6             1. Chronic systolic HF, NYHA class I, stage C. S/p ICD. LVEF 25% in March 2023.  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  Hemodynamic status: warm, euvolemic, normotensive.  Medications: optimal doses of metoprolol xl, entresto, spironolactone, farxiga. Furosemide 40 mg daily.  Plan:  -no changes on medications today.  -follow up in 4 months with labs and CPX.         No HF ER visits or hospitalizations in 3 years.     2. HTN. Stable.   On medications as above.     3. Atrial fibrillation. Paroxismal . On Xarelto.     4. CAD s/p PCI 2020. Stable.  On statin, aspirin and HF as above.

## 2023-08-15 LAB — NT-PROBNP SERPL IA-MCNC: 176 PG/ML

## 2023-08-28 RX ORDER — SACUBITRIL AND VALSARTAN 97; 103 MG/1; MG/1
1 TABLET, FILM COATED ORAL 2 TIMES DAILY
Qty: 60 TABLET | Refills: 6 | Status: SHIPPED | OUTPATIENT
Start: 2023-08-28

## 2023-09-29 DIAGNOSIS — I50.42 CHRONIC COMBINED SYSTOLIC AND DIASTOLIC CONGESTIVE HEART FAILURE: ICD-10-CM

## 2023-09-29 RX ORDER — METOPROLOL SUCCINATE 200 MG/1
200 TABLET, EXTENDED RELEASE ORAL
Qty: 30 TABLET | Refills: 6 | Status: SHIPPED | OUTPATIENT
Start: 2023-09-29

## 2024-02-21 ENCOUNTER — OFFICE VISIT (OUTPATIENT)
Dept: TRANSPLANT | Facility: CLINIC | Age: 59
End: 2024-02-21
Payer: MEDICARE

## 2024-02-21 ENCOUNTER — HOSPITAL ENCOUNTER (OUTPATIENT)
Dept: CARDIOLOGY | Facility: HOSPITAL | Age: 59
Discharge: HOME | End: 2024-02-21
Attending: INTERNAL MEDICINE
Payer: MEDICARE

## 2024-02-21 VITALS
BODY MASS INDEX: 27.73 KG/M2 | WEIGHT: 223 LBS | DIASTOLIC BLOOD PRESSURE: 83 MMHG | HEART RATE: 60 BPM | SYSTOLIC BLOOD PRESSURE: 116 MMHG | HEIGHT: 75 IN

## 2024-02-21 VITALS
BODY MASS INDEX: 27.19 KG/M2 | WEIGHT: 223.31 LBS | HEART RATE: 60 BPM | SYSTOLIC BLOOD PRESSURE: 131 MMHG | HEIGHT: 76 IN | DIASTOLIC BLOOD PRESSURE: 80 MMHG

## 2024-02-21 DIAGNOSIS — I50.42 CHRONIC COMBINED SYSTOLIC AND DIASTOLIC CONGESTIVE HEART FAILURE: Primary | ICD-10-CM

## 2024-02-21 DIAGNOSIS — E78.2 MIXED HYPERLIPIDEMIA: ICD-10-CM

## 2024-02-21 DIAGNOSIS — Z95.810 ICD (IMPLANTABLE CARDIOVERTER-DEFIBRILLATOR) IN PLACE: ICD-10-CM

## 2024-02-21 DIAGNOSIS — I25.10 CORONARY ARTERY DISEASE INVOLVING NATIVE CORONARY ARTERY OF NATIVE HEART WITHOUT ANGINA PECTORIS: ICD-10-CM

## 2024-02-21 DIAGNOSIS — I50.42 CHRONIC COMBINED SYSTOLIC AND DIASTOLIC CONGESTIVE HEART FAILURE: ICD-10-CM

## 2024-02-21 DIAGNOSIS — I48.0 PAROXYSMAL ATRIAL FIBRILLATION: ICD-10-CM

## 2024-02-21 LAB
CV STRESS BASE HR: 60 BPM
DIASTOLIC BLOOD PRESSURE: 83 MMHG
OHS CV CPX 1 MINUTE RECOVERY HEART RATE: 86 BPM
OHS CV CPX 85 PERCENT MAX PREDICTED HEART RATE MALE: 139
OHS CV CPX ANAEROBIC THRESHOLD DIASTOLIC BLOOD PRESSURE: 73 MMHG
OHS CV CPX ANAEROBIC THRESHOLD HEART RATE: 82
OHS CV CPX ANAEROBIC THRESHOLD RATE PRESSURE PRODUCT: 9102
OHS CV CPX ANAEROBIC THRESHOLD SYSTOLIC BLOOD PRESSURE: 111
OHS CV CPX DATA GRADE - AT: 8.4
OHS CV CPX DATA GRADE - PEAK: 13.4
OHS CV CPX DATA O2 SAT - PEAK: 96
OHS CV CPX DATA O2 SAT - REST: 94
OHS CV CPX DATA SPEED - AT: 2.5
OHS CV CPX DATA SPEED - PEAK: 3.6
OHS CV CPX DATA TIME - AT: 4.22
OHS CV CPX DATA TIME - PEAK: 6.4
OHS CV CPX DATA VE/VCO2 - AT: 28
OHS CV CPX DATA VE/VCO2 - PEAK: 33
OHS CV CPX DATA VE/VO2 - AT: 22
OHS CV CPX DATA VE/VO2 - PEAK: 35
OHS CV CPX DATA VO2 - AT: 13.1
OHS CV CPX DATA VO2 - PEAK: 18.1
OHS CV CPX DATA VO2 - REST: 3.8
OHS CV CPX FEV1/FVC: 0.6
OHS CV CPX FORCED EXPIRATORY VOLUME: 2.25
OHS CV CPX FORCED VITAL CAPACITY (FVC): 3.77
OHS CV CPX HIGHEST VO: 36.7
OHS CV CPX MAX PREDICTED HEART RATE: 163
OHS CV CPX MAXIMAL VOLUNTARY VENTILATION (MVV) PREDICTED: 90
OHS CV CPX MAXIMAL VOLUNTARY VENTILATION (MVV): 65
OHS CV CPX MAXIUMUM EXERCISE VENTILATION (VE MAX): 57.8
OHS CV CPX PATIENT AGE: 57
OHS CV CPX PATIENT HEIGHT IN: 75
OHS CV CPX PATIENT IS FEMALE AGE 11-19: 0
OHS CV CPX PATIENT IS FEMALE AGE GREATER THAN 19: 0
OHS CV CPX PATIENT IS FEMALE AGE LESS THAN 11: 0
OHS CV CPX PATIENT IS FEMALE: 0
OHS CV CPX PATIENT IS MALE AGE 11-25: 0
OHS CV CPX PATIENT IS MALE AGE GREATER THAN 25: 1
OHS CV CPX PATIENT IS MALE AGE LESS THAN 11: 0
OHS CV CPX PATIENT IS MALE GREATER THAN 18: 1
OHS CV CPX PATIENT IS MALE LESS THAN OR EQUAL TO 18: 0
OHS CV CPX PATIENT IS MALE: 1
OHS CV CPX PATIENT WEIGHT RETURNED IN OZ: 3568
OHS CV CPX PEAK DIASTOLIC BLOOD PRESSURE: 74 MMHG
OHS CV CPX PEAK HEAR RATE: 100 BPM
OHS CV CPX PEAK RATE PRESSURE PRODUCT: NORMAL
OHS CV CPX PEAK SYSTOLIC BLOOD PRESSURE: 122 MMHG
OHS CV CPX PERCENT BODY FAT: 17.9
OHS CV CPX PERCENT MAX PREDICTED HEART RATE ACHIEVED: 61
OHS CV CPX PREDICTED VO2: 36.7 ML/KG/MIN
OHS CV CPX RATE PRESSURE PRODUCT PRESENTING: 6960
OHS CV CPX REST PET CO2: 33
OHS CV CPX VE/VCO2 SLOPE: 22.3
STRESS ECHO POST EXERCISE DUR MIN: 6 MINUTES
STRESS ECHO POST EXERCISE DUR SEC: 24 SECONDS
SYSTOLIC BLOOD PRESSURE: 116 MMHG

## 2024-02-21 PROCEDURE — 1159F MED LIST DOCD IN RCRD: CPT | Mod: CPTII,S$GLB,, | Performed by: INTERNAL MEDICINE

## 2024-02-21 PROCEDURE — 4010F ACE/ARB THERAPY RXD/TAKEN: CPT | Mod: CPTII,S$GLB,, | Performed by: INTERNAL MEDICINE

## 2024-02-21 PROCEDURE — 94621 CARDIOPULM EXERCISE TESTING: CPT

## 2024-02-21 PROCEDURE — 94621 CARDIOPULM EXERCISE TESTING: CPT | Mod: 26,,, | Performed by: INTERNAL MEDICINE

## 2024-02-21 PROCEDURE — 3075F SYST BP GE 130 - 139MM HG: CPT | Mod: CPTII,S$GLB,, | Performed by: INTERNAL MEDICINE

## 2024-02-21 PROCEDURE — 99214 OFFICE O/P EST MOD 30 MIN: CPT | Mod: S$GLB,,, | Performed by: INTERNAL MEDICINE

## 2024-02-21 PROCEDURE — 99999 PR PBB SHADOW E&M-EST. PATIENT-LVL IV: CPT | Mod: PBBFAC,,, | Performed by: INTERNAL MEDICINE

## 2024-02-21 PROCEDURE — 3079F DIAST BP 80-89 MM HG: CPT | Mod: CPTII,S$GLB,, | Performed by: INTERNAL MEDICINE

## 2024-02-21 PROCEDURE — 3008F BODY MASS INDEX DOCD: CPT | Mod: CPTII,S$GLB,, | Performed by: INTERNAL MEDICINE

## 2024-02-21 NOTE — LETTER
February 21, 2024        José Antonio Iraheta  [address] [facility] [address]  Phone: [phone]  Fax: [phone]             [facility]  [address]  [place] [address]  Phone: [phone]   Patient: Jose Collado   MR Number: [id number]   YOB: 1965   Date of Visit: 2/21/2024       Dear Dr. José Antonio Iraheta    Thank you for referring Jose Collado to me for evaluation. Attached you will find relevant portions of my assessment and plan of care.    If you have questions, please do not hesitate to call me. I look forward to following Jose Collado along with you.    Sincerely,    Osmin Mcpherson MD    Enclosure    If you would like to receive this communication electronically, please contact [email or web address] or [phone] to request [employer] Link access.    [employer] Link is a tool which provides read-only access to select patient information with whom you have a relationship. Its easy to use and provides real time access to review your patients record including encounter summaries, notes, results, and demographic information.    If you feel you have received this communication in error or would no longer like to receive these types of communications, please e-mail [email or web address]

## 2024-02-21 NOTE — PROGRESS NOTES
[facility] Follow up.        Attending Physician: Osmin Mcpherson MD.  The patient's last visit with me was on 8/14/2023.         HPIAllie Collado is a 57 y.o. male who presents for follow-up.     57 year old male with history of ICM w LVEF 20% s/p PCI to LAD (June 2018) s/p AICD, HTN who comes for regular follow-up. He has been stable from a HF standpoint.     Last appointment he was doing well. He did not have HF hospitalizations since the beginning of 2020.  He continues to be active.     March 28, 2023 he continues to do well. He walks 2 miles almost every day without limiting symptoms. He feels tired if he has to lift a heavy object.     Today August 14, 2023, patient comes with family. He continues to feel well. He is happy to report he has not had any ER visits or hospitalizations since last appointment and since early 2020. He continue to do exercise. He increased his walking distance from 2 miles to 3 miles. He denies congestive symptoms. He is not limited on daily activities.    February 21, 2024:  no CV symptoms.  No ER visits or hospitalizations in years. Had CPX ordered last visit.          Review of Systems   Constitutional:  Negative for activity change, appetite change, chills, diaphoresis and fever.   HENT:  Negative for nasal congestion, rhinorrhea and sore throat.    Eyes:  Negative for visual disturbance.   Respiratory:  Negative for cough, choking, chest tightness, shortness of breath and wheezing.    Cardiovascular:  Negative for chest pain, palpitations and leg swelling.   Gastrointestinal:  Negative for abdominal pain, diarrhea, nausea and vomiting.   Genitourinary:  Negative for difficulty urinating, dysuria and hematuria.   Integumentary:  Negative for rash.   Neurological:  Negative for seizures, syncope and light-headedness.   Psychiatric/Behavioral:  Negative  for agitation and hallucinations.         Past Medical History:   Diagnosis Date    Acute hypoxemic respiratory failure     Acute kidney injury     Aneurysm     CHF (congestive heart failure)     Coronary artery disease     Diabetic amyotrophy associated with type 2 diabetes mellitus     Edema due to congestive heart failure     Fever     Heart failure     High blood pressure     History of respiratory failure     Hypertension     ICD (implantable cardioverter-defibrillator) in place     Inflammation of lung     Irregular heartbeat     SOB (shortness of breath)         Past Surgical History:   Procedure Laterality Date    atrial difibrillator  11/26/2018        Family History   Problem Relation Age of Onset    Hypertension Mother         Review of patient's allergies indicates:  No Known Allergies     Current Outpatient Medications   Medication Instructions    allopurinoL (ZYLOPRIM) 100 MG tablet No dose, route, or frequency recorded.    aspirin (ECOTRIN) 81 mg, Oral, Daily    atorvastatin (LIPITOR) 80 mg, Oral, Daily    EASY TOUCH TWIST LANCETS 30 gauge Misc No dose, route, or frequency recorded.    ENTRESTO  mg per tablet 1 tablet, Oral, 2 times daily    FARXIGA 10 mg, Oral, Daily    furosemide (LASIX) 40 mg, Oral, Daily    metoprolol succinate (TOPROL-XL) 200 mg, Oral    MITIGARE 0.6 mg Cap No dose, route, or frequency recorded.    pantoprazole (PROTONIX) 40 MG tablet TAKE 1 TABLET BY MOUTH EVERY DAY    potassium chloride SA (K-DUR,KLOR-CON) 20 MEQ tablet 40 mEq, Oral, Daily    spironolactone (ALDACTONE) 50 mg, Oral, Daily    traZODone (DESYREL) 50 MG tablet TAKE 1 TABLET BY MOUTH ATBEDTIME    TRUE METRIX GLUCOSE TEST STRIP Strp No dose, route, or frequency recorded.    XARELTO 20 mg Tab TAKE 1 TABLET BY MOUTH ONCE DAILY WITH THE EVENING MEAL        Vitals:    02/21/24 1312   BP: 131/80   Pulse: 60        Wt Readings from Last 3 Encounters:   02/21/24 101.3 kg (223 lb 5.2 oz)   02/21/24 101.2 kg (223 lb)  "  08/14/23 100.5 kg (221 lb 9 oz)     Temp Readings from Last 3 Encounters:   No data found for Temp     BP Readings from Last 3 Encounters:   02/21/24 131/80   02/21/24 116/83   08/14/23 124/87     Pulse Readings from Last 3 Encounters:   02/21/24 60   02/21/24 60   08/14/23 60        Body mass index is 27.18 kg/m². Estimated body surface area is 2.33 meters squared as calculated from the following:    Height as of this encounter: 6' 4" (1.93 m).    Weight as of this encounter: 101.3 kg (223 lb 5.2 oz).     Physical Exam  Constitutional:       Appearance: He is well-developed.   HENT:      Head: Normocephalic and atraumatic.      Right Ear: External ear normal.      Left Ear: External ear normal.   Eyes:      Conjunctiva/sclera: Conjunctivae normal.      Pupils: Pupils are equal, round, and reactive to light.   Neck:      Vascular: No JVD.   Cardiovascular:      Rate and Rhythm: Normal rate and regular rhythm.      Pulses: Intact distal pulses.      Heart sounds: S1 normal and S2 normal. No murmur heard.     No friction rub. No gallop.   Pulmonary:      Effort: Pulmonary effort is normal.      Breath sounds: Normal breath sounds.   Abdominal:      General: Bowel sounds are normal. There is no distension.      Palpations: Abdomen is soft.      Tenderness: There is no abdominal tenderness. There is no guarding or rebound.   Musculoskeletal:      Cervical back: Normal range of motion and neck supple.   Neurological:      Mental Status: He is alert and oriented to person, place, and time.          Lab Results   Component Value Date     (H) 03/01/2019     02/21/2024    K 4.2 02/21/2024     02/21/2024    CO2 29 02/21/2024    BUN 14 02/21/2024    CREATININE 1.1 02/21/2024     02/21/2024    AST 25 02/21/2024    ALT 18 02/21/2024    ALBUMIN 4.0 02/21/2024    PROT 7.6 02/21/2024    BILITOT 1.5 (H) 02/21/2024    WBC 5.57 02/21/2024    HGB 15.3 02/21/2024    HCT 47.4 02/21/2024     (L) " 02/21/2024    TSH 1.318 03/01/2019    CHOL 129 08/14/2023    HDL 33 (L) 08/14/2023    LDLCALC 79.2 08/14/2023    TRIG 84 08/14/2023       @LABRCNTIP(cpk,cpkmb,troponini,mb)@     No results found for this visit on 02/21/24.       Results for orders placed during the hospital encounter of 03/28/23    Echo    Interpretation Summary  · The left ventricle is mildly enlarged with severely decreased systolic function.The estimated ejection fraction is 20-25% with marked RWMAa and large area of akinesis.  · A small solid left ventricular thrombus is possible. The thrombus is fixed and located in the apex.  · There are segmental left ventricular wall motion abnormalities.  · Grade I left ventricular diastolic dysfunction.  · Biatrial enlargement  · Mild right ventricular enlargement with low normal right ventricular systolic function.  · Mild mitral regurgitation.  · Mild tricuspid regurgitation.  · Normal central venous pressure (3 mmHg).  · The estimated PA systolic pressure is 27 mmHg.        No results found for this or any previous visit.         Assessment and Plan:  Chronic combined systolic and diastolic congestive heart failure  -     CBC Auto Differential; Future; Expected date: 08/21/2024  -     Comprehensive Metabolic Panel; Future; Expected date: 08/21/2024  -     NT-Pro Natriuretic Peptide; Future; Expected date: 08/21/2024  -     Ambulatory referral/consult to Electrophysiology; Future; Expected date: 02/28/2024    Paroxysmal atrial fibrillation    Mixed hyperlipidemia    ICD (implantable cardioverter-defibrillator) in place    Coronary artery disease involving native coronary artery of native heart without angina pectoris            1. Chronic systolic HF, NYHA class I, stage C. S/p ICD. LVEF 25% in March 2023.  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  Hemodynamic status: warm, euvolemic, normotensive.  Medications: optimal doses of metoprolol xl, entresto, spironolactone, farxiga. Furosemide 40 mg  daily.  Clinical course: no ER visits or hospitalizations in years (see chart). NYHA class I symptoms. Tolerating maximal medical therapy.  CPX result discussed with the patient: no high risk findings.   Plan:  -no changes on medications today.  -follow up in 6 months with labs.         No HF ER visits or hospitalizations in @ least 4 years.     2. HTN. Stable.   On medications as above.     3. Atrial fibrillation. Paroxismal . On Xarelto.     4. CAD s/p PCI 2020. Stable.  On statin, aspirin and HF as above.

## 2024-02-26 RX ORDER — FUROSEMIDE 40 MG/1
40 TABLET ORAL DAILY
Qty: 30 TABLET | Refills: 6 | Status: SHIPPED | OUTPATIENT
Start: 2024-02-26 | End: 2024-08-24

## 2024-02-26 RX ORDER — ATORVASTATIN CALCIUM 80 MG/1
80 TABLET, FILM COATED ORAL
Qty: 30 TABLET | Refills: 5 | Status: SHIPPED | OUTPATIENT
Start: 2024-02-26

## 2024-03-19 DIAGNOSIS — Z95.810 ICD (IMPLANTABLE CARDIOVERTER-DEFIBRILLATOR) IN PLACE: Primary | ICD-10-CM

## 2024-03-19 DIAGNOSIS — I50.42 CHRONIC COMBINED SYSTOLIC AND DIASTOLIC CONGESTIVE HEART FAILURE: ICD-10-CM

## 2024-03-26 RX ORDER — DAPAGLIFLOZIN 10 MG/1
10 TABLET, FILM COATED ORAL DAILY
Qty: 30 TABLET | Refills: 5 | Status: SHIPPED | OUTPATIENT
Start: 2024-03-26

## 2024-04-03 DIAGNOSIS — I50.42 CHRONIC COMBINED SYSTOLIC AND DIASTOLIC CONGESTIVE HEART FAILURE: ICD-10-CM

## 2024-04-03 RX ORDER — RIVAROXABAN 20 MG/1
TABLET, FILM COATED ORAL
Qty: 30 TABLET | Refills: 3 | Status: SHIPPED | OUTPATIENT
Start: 2024-04-03

## 2024-04-26 RX ORDER — SPIRONOLACTONE 50 MG/1
50 TABLET, FILM COATED ORAL DAILY
Qty: 30 TABLET | Refills: 6 | Status: SHIPPED | OUTPATIENT
Start: 2024-04-26

## 2024-04-26 RX ORDER — SACUBITRIL AND VALSARTAN 97; 103 MG/1; MG/1
1 TABLET, FILM COATED ORAL 2 TIMES DAILY
Qty: 60 TABLET | Refills: 6 | Status: SHIPPED | OUTPATIENT
Start: 2024-04-26

## 2024-08-01 DIAGNOSIS — I50.42 CHRONIC COMBINED SYSTOLIC AND DIASTOLIC CONGESTIVE HEART FAILURE: ICD-10-CM

## 2024-08-01 RX ORDER — RIVAROXABAN 20 MG/1
TABLET, FILM COATED ORAL
Qty: 30 TABLET | Refills: 3 | Status: SHIPPED | OUTPATIENT
Start: 2024-08-01

## 2024-08-22 RX ORDER — ATORVASTATIN CALCIUM 80 MG/1
80 TABLET, FILM COATED ORAL
Qty: 30 TABLET | Refills: 5 | Status: SHIPPED | OUTPATIENT
Start: 2024-08-22

## 2024-11-21 RX ORDER — SACUBITRIL AND VALSARTAN 97; 103 MG/1; MG/1
1 TABLET, FILM COATED ORAL 2 TIMES DAILY
Qty: 60 TABLET | Refills: 1 | Status: SHIPPED | OUTPATIENT
Start: 2024-11-21

## 2024-11-21 RX ORDER — DAPAGLIFLOZIN 10 MG/1
10 TABLET, FILM COATED ORAL DAILY
Qty: 30 TABLET | Refills: 1 | Status: SHIPPED | OUTPATIENT
Start: 2024-11-21

## 2024-11-25 DIAGNOSIS — I50.42 CHRONIC COMBINED SYSTOLIC AND DIASTOLIC CONGESTIVE HEART FAILURE: ICD-10-CM

## 2024-11-25 RX ORDER — RIVAROXABAN 20 MG/1
TABLET, FILM COATED ORAL
Qty: 30 TABLET | Refills: 1 | Status: SHIPPED | OUTPATIENT
Start: 2024-11-25

## 2024-12-30 RX ORDER — DAPAGLIFLOZIN 10 MG/1
10 TABLET, FILM COATED ORAL
Qty: 30 TABLET | Refills: 0 | Status: SHIPPED | OUTPATIENT
Start: 2024-12-30

## 2025-01-06 DIAGNOSIS — Z76.89 ENCOUNTER TO ESTABLISH CARE: Primary | ICD-10-CM

## 2025-01-07 ENCOUNTER — OFFICE VISIT (OUTPATIENT)
Dept: CARDIOLOGY | Facility: CLINIC | Age: 60
End: 2025-01-07
Payer: MEDICARE

## 2025-01-07 ENCOUNTER — HOSPITAL ENCOUNTER (OUTPATIENT)
Dept: CARDIOLOGY | Facility: HOSPITAL | Age: 60
Discharge: HOME | End: 2025-01-07
Attending: INTERNAL MEDICINE
Payer: MEDICARE

## 2025-01-07 VITALS
HEART RATE: 60 BPM | BODY MASS INDEX: 27.24 KG/M2 | SYSTOLIC BLOOD PRESSURE: 122 MMHG | HEIGHT: 76 IN | OXYGEN SATURATION: 98 % | DIASTOLIC BLOOD PRESSURE: 86 MMHG | WEIGHT: 223.69 LBS

## 2025-01-07 DIAGNOSIS — I25.3 LEFT VENTRICULAR ANEURYSM: ICD-10-CM

## 2025-01-07 DIAGNOSIS — Z76.89 ENCOUNTER TO ESTABLISH CARE: ICD-10-CM

## 2025-01-07 DIAGNOSIS — I25.10 CORONARY ARTERY DISEASE INVOLVING NATIVE CORONARY ARTERY OF NATIVE HEART WITHOUT ANGINA PECTORIS: ICD-10-CM

## 2025-01-07 DIAGNOSIS — Z95.810 ICD (IMPLANTABLE CARDIOVERTER-DEFIBRILLATOR) IN PLACE: ICD-10-CM

## 2025-01-07 DIAGNOSIS — I48.0 PAROXYSMAL ATRIAL FIBRILLATION: Primary | ICD-10-CM

## 2025-01-07 DIAGNOSIS — E78.2 MIXED HYPERLIPIDEMIA: ICD-10-CM

## 2025-01-07 DIAGNOSIS — I50.42 CHRONIC COMBINED SYSTOLIC AND DIASTOLIC CONGESTIVE HEART FAILURE: ICD-10-CM

## 2025-01-07 PROCEDURE — 99214 OFFICE O/P EST MOD 30 MIN: CPT | Mod: 25,S$GLB,, | Performed by: INTERNAL MEDICINE

## 2025-01-07 PROCEDURE — 3008F BODY MASS INDEX DOCD: CPT | Mod: CPTII,S$GLB,, | Performed by: INTERNAL MEDICINE

## 2025-01-07 PROCEDURE — 93010 ELECTROCARDIOGRAM REPORT: CPT | Mod: ,,, | Performed by: INTERNAL MEDICINE

## 2025-01-07 PROCEDURE — 3074F SYST BP LT 130 MM HG: CPT | Mod: CPTII,S$GLB,, | Performed by: INTERNAL MEDICINE

## 2025-01-07 PROCEDURE — 99999 PR PBB SHADOW E&M-EST. PATIENT-LVL III: CPT | Mod: PBBFAC,,, | Performed by: INTERNAL MEDICINE

## 2025-01-07 PROCEDURE — 1160F RVW MEDS BY RX/DR IN RCRD: CPT | Mod: CPTII,S$GLB,, | Performed by: INTERNAL MEDICINE

## 2025-01-07 PROCEDURE — 3079F DIAST BP 80-89 MM HG: CPT | Mod: CPTII,S$GLB,, | Performed by: INTERNAL MEDICINE

## 2025-01-07 PROCEDURE — 1159F MED LIST DOCD IN RCRD: CPT | Mod: CPTII,S$GLB,, | Performed by: INTERNAL MEDICINE

## 2025-01-07 PROCEDURE — 4010F ACE/ARB THERAPY RXD/TAKEN: CPT | Mod: CPTII,S$GLB,, | Performed by: INTERNAL MEDICINE

## 2025-01-07 PROCEDURE — 93005 ELECTROCARDIOGRAM TRACING: CPT | Mod: PO

## 2025-01-07 RX ORDER — ATORVASTATIN CALCIUM 80 MG/1
80 TABLET, FILM COATED ORAL NIGHTLY
Qty: 90 TABLET | Refills: 3 | Status: SHIPPED | OUTPATIENT
Start: 2025-01-07

## 2025-01-07 RX ORDER — SACUBITRIL AND VALSARTAN 97; 103 MG/1; MG/1
1 TABLET, FILM COATED ORAL 2 TIMES DAILY
Qty: 180 TABLET | Refills: 3 | Status: SHIPPED | OUTPATIENT
Start: 2025-01-07

## 2025-01-07 RX ORDER — METOPROLOL SUCCINATE 200 MG/1
200 TABLET, EXTENDED RELEASE ORAL DAILY
Qty: 90 TABLET | Refills: 3 | Status: SHIPPED | OUTPATIENT
Start: 2025-01-07

## 2025-01-07 RX ORDER — DAPAGLIFLOZIN 10 MG/1
10 TABLET, FILM COATED ORAL DAILY
Qty: 90 TABLET | Refills: 3 | Status: SHIPPED | OUTPATIENT
Start: 2025-01-07

## 2025-01-07 RX ORDER — FUROSEMIDE 40 MG/1
40 TABLET ORAL DAILY
Qty: 90 TABLET | Refills: 3 | Status: SHIPPED | OUTPATIENT
Start: 2025-01-07 | End: 2026-01-02

## 2025-01-07 RX ORDER — ATORVASTATIN CALCIUM 80 MG/1
80 TABLET, FILM COATED ORAL NIGHTLY
Qty: 90 TABLET | Refills: 3 | Status: SHIPPED | OUTPATIENT
Start: 2025-01-07 | End: 2025-01-07 | Stop reason: SDUPTHER

## 2025-01-07 RX ORDER — SPIRONOLACTONE 50 MG/1
50 TABLET, FILM COATED ORAL DAILY
Qty: 90 TABLET | Refills: 3 | Status: SHIPPED | OUTPATIENT
Start: 2025-01-07

## 2025-01-07 NOTE — PROGRESS NOTES
Subjective:   Patient ID:  Jose Collado is a 59 y.o. male who presents for follow-up of No chief complaint on file.  Pt presents to [facility], seeing transplant team  59 year old male with history of ICM w LVEF 20% s/p PCI to LAD (June 2018) s/p AICD, HTN     Pt walks 2-3 miles /day, no sx  Coronary Artery Disease  Presents for follow-up visit. Pertinent negatives include no chest pain, chest pressure, chest tightness, dizziness, leg swelling, muscle weakness, palpitations, shortness of breath or weight gain. His past medical history is significant for CHF. The symptoms have been stable. Compliance with diet is good. Compliance with exercise is good. Compliance with medications is good.   Congestive Heart Failure  Presents for follow-up visit. Pertinent negatives include no chest pain, chest pressure, muscle weakness, palpitations or shortness of breath. The symptoms have been stable. His past medical history is significant for CAD. Compliance with total regimen is %. Compliance with diet is %. Compliance with exercise is %. Compliance with medications is %.   Hyperlipidemia  This is a chronic problem. The current episode started more than 1 year ago. The problem is controlled. Pertinent negatives include no chest pain or shortness of breath. Current antihyperlipidemic treatment includes statins. The current treatment provides moderate improvement of lipids. There are no compliance problems.     Review of Systems   Constitutional: Negative. Negative for weight gain.   HENT: Negative.     Eyes: Negative.    Cardiovascular: Negative.  Negative for chest pain, leg swelling and palpitations.   Respiratory: Negative.  Negative for chest tightness and shortness of breath.    Endocrine: Negative.    Hematologic/Lymphatic: Negative.    Skin: Negative.    Musculoskeletal:  Negative for muscle weakness.   Gastrointestinal: Negative.    Genitourinary: Negative.    Neurological: Negative.  Negative  for dizziness.   Psychiatric/Behavioral: Negative.     Allergic/Immunologic: Negative.    All other systems reviewed and are negative.    Family History   Problem Relation Name Age of Onset    Hypertension Mother       Past Medical History:   Diagnosis Date    Acute hypoxemic respiratory failure     Acute kidney injury     Aneurysm     CHF (congestive heart failure)     Coronary artery disease     Diabetic amyotrophy associated with type 2 diabetes mellitus     Edema due to congestive heart failure     Fever     Heart failure     High blood pressure     History of respiratory failure     Hypertension     ICD (implantable cardioverter-defibrillator) in place     Inflammation of lung     Irregular heartbeat     SOB (shortness of breath)      Social History     Socioeconomic History    Marital status:    Tobacco Use    Smoking status: Never    Smokeless tobacco: Never   Substance and Sexual Activity    Alcohol use: No    Drug use: No    Sexual activity: Never     Social Drivers of Health     Financial Resource Strain: Low Risk  (1/6/2025)    Overall Financial Resource Strain (CARDIA)     Difficulty of Paying Living Expenses: Not very hard   Food Insecurity: No Food Insecurity (1/6/2025)    Hunger Vital Sign     Worried About Running Out of Food in the Last Year: Never true     Ran Out of Food in the Last Year: Never true   Physical Activity: Insufficiently Active (1/6/2025)    Exercise Vital Sign     Days of Exercise per Week: 3 days     Minutes of Exercise per Session: 30 min   Stress: Stress Concern Present (1/6/2025)    [place] [employer] - Occupational Stress Questionnaire     Feeling of Stress : Rather much   Housing Stability: Unknown (1/6/2025)    Housing Stability Vital Sign     Unable to Pay for Housing in the Last Year: No     Current Outpatient Medications on File Prior to Visit   Medication Sig Dispense Refill    allopurinoL (ZYLOPRIM) 100 MG tablet       aspirin (ECOTRIN) 81 MG  EC tablet Take 1 tablet (81 mg total) by mouth once daily. 30 tablet 6    atorvastatin (LIPITOR) 80 MG tablet TAKE ONE TABLET BY MOUTH EVERY DAY 30 tablet 5    EASY TOUCH TWIST LANCETS 30 gauge Misc       ENTRESTO  mg per tablet TAKE ONE TABLET BY MOUTH TWO TIMES A DAY 60 tablet 1    FARXIGA 10 mg tablet TAKE ONE TABLET BY MOUTH EVERY DAY 30 tablet 0    metoprolol succinate (TOPROL-XL) 200 MG 24 hr tablet TAKE ONE TABLET BY MOUTH EVERY DAY 30 tablet 6    pantoprazole (PROTONIX) 40 MG tablet TAKE 1 TABLET BY MOUTH EVERY DAY 30 tablet 3    spironolactone (ALDACTONE) 50 MG tablet TAKE 1 TABLET (50 MG TOTAL) BY MOUTH ONCE DAILY. 30 tablet 6    traZODone (DESYREL) 50 MG tablet TAKE 1 TABLET BY MOUTH ATBEDTIME 30 tablet 1    TRUE METRIX GLUCOSE TEST STRIP Strp       XARELTO 20 mg Tab TAKE 1 TABLET BY MOUTH ONCE DAILY WITH THE EVENING MEAL 30 tablet 1    furosemide (LASIX) 40 MG tablet TAKE 1 TABLET (40 MG TOTAL) BY MOUTH ONCE DAILY. 30 tablet 6    MITIGARE 0.6 mg Cap  (Patient not taking: Reported on 1/7/2025)      potassium chloride SA (K-DUR,KLOR-CON) 20 MEQ tablet Take 40 mEq by mouth once daily.  (Patient not taking: Reported on 1/7/2025)       No current facility-administered medications on file prior to visit.     Review of patient's allergies indicates:  No Known Allergies    Objective:     Physical Exam  Vitals and nursing note reviewed.   Constitutional:       Appearance: He is well-developed.   HENT:      Head: Normocephalic and atraumatic.   Eyes:      Conjunctiva/sclera: Conjunctivae normal.      Pupils: Pupils are equal, round, and reactive to light.   Cardiovascular:      Rate and Rhythm: Normal rate and regular rhythm.      Pulses: Intact distal pulses.      Heart sounds: Normal heart sounds.   Pulmonary:      Effort: Pulmonary effort is normal.      Breath sounds: Normal breath sounds.   Abdominal:      General: Bowel sounds are normal.      Palpations: Abdomen is soft.   Musculoskeletal:       Cervical back: Normal range of motion and neck supple.   Skin:     General: Skin is warm and dry.   Neurological:      Mental Status: He is alert and oriented to person, place, and time.         Assessment:     1. Paroxysmal atrial fibrillation    2. Mixed hyperlipidemia    3. ICD (implantable cardioverter-defibrillator) in place    4. Coronary artery disease involving native coronary artery of native heart without angina pectoris    5. Chronic combined systolic and diastolic congestive heart failure    6. Left ventricular aneurysm        Plan:     Paroxysmal atrial fibrillation    Mixed hyperlipidemia    ICD (implantable cardioverter-defibrillator) in place    Coronary artery disease involving native coronary artery of native heart without angina pectoris    Chronic combined systolic and diastolic congestive heart failure    Left ventricular aneurysm      Aicd check  echo  Continue entresto, farxiga, metoprolol, aldactone- CMY/CHF  Continue statin- HLP  Continue asa- CAD  Continue xarelto- PAF

## 2025-01-08 LAB
OHS QRS DURATION: 92 MS
OHS QTC CALCULATION: 440 MS

## 2025-01-28 ENCOUNTER — HOSPITAL ENCOUNTER (OUTPATIENT)
Dept: CARDIOLOGY | Facility: HOSPITAL | Age: 60
Discharge: HOME | End: 2025-01-28
Attending: INTERNAL MEDICINE
Payer: MEDICARE

## 2025-01-28 VITALS
BODY MASS INDEX: 27.16 KG/M2 | DIASTOLIC BLOOD PRESSURE: 86 MMHG | SYSTOLIC BLOOD PRESSURE: 122 MMHG | HEIGHT: 76 IN | WEIGHT: 223 LBS | HEART RATE: 60 BPM

## 2025-01-28 DIAGNOSIS — I50.42 CHRONIC COMBINED SYSTOLIC AND DIASTOLIC CONGESTIVE HEART FAILURE: ICD-10-CM

## 2025-01-28 LAB
AORTIC ROOT ANNULUS: 3.03 CM
APICAL FOUR CHAMBER EJECTION FRACTION: 39 %
APICAL TWO CHAMBER EJECTION FRACTION: 32 %
ASCENDING AORTA: 2.69 CM
AV INDEX (PROSTH): 0.67
AV MEAN GRADIENT: 4 MMHG
AV PEAK GRADIENT: 7 MMHG
AV VALVE AREA BY VELOCITY RATIO: 2.4 CM²
AV VALVE AREA: 2.3 CM²
AV VELOCITY RATIO: 0.69
BSA FOR ECHO PROCEDURE: 2.33 M2
CV ECHO LV RWT: 0.32 CM
DOP CALC AO PEAK VEL: 1.3 M/S
DOP CALC AO VTI: 31.5 CM
DOP CALC LVOT AREA: 3.5 CM2
DOP CALC LVOT DIAMETER: 2.1 CM
DOP CALC LVOT PEAK VEL: 0.9 M/S
DOP CALC LVOT STROKE VOLUME: 72.7 CM3
DOP CALC RVOT PEAK VEL: 0.63 M/S
DOP CALC RVOT VTI: 13.1 CM
DOP CALCLVOT PEAK VEL VTI: 21 CM
E WAVE DECELERATION TIME: 217 MSEC
E/A RATIO: 0.72
E/E' RATIO: 7 M/S
ECHO LV POSTERIOR WALL: 0.9 CM (ref 0.6–1.1)
EJECTION FRACTION: 25 %
FRACTIONAL SHORTENING: 22.8 % (ref 28–44)
INTERVENTRICULAR SEPTUM: 0.8 CM (ref 0.6–1.1)
IVC DIAMETER: 0.7 CM
LA MAJOR: 4.9 CM
LA MINOR: 4.9 CM
LA WIDTH: 3.4 CM
LEFT ATRIUM AREA SYSTOLIC (APICAL 2 CHAMBER): 22.42 CM2
LEFT ATRIUM AREA SYSTOLIC (APICAL 4 CHAMBER): 18.84 CM2
LEFT ATRIUM SIZE: 4.2 CM
LEFT ATRIUM VOLUME INDEX MOD: 27 ML/M2
LEFT ATRIUM VOLUME INDEX: 26 ML/M2
LEFT ATRIUM VOLUME MOD: 62 ML
LEFT ATRIUM VOLUME: 59 CM3
LEFT INTERNAL DIMENSION IN SYSTOLE: 4.4 CM (ref 2.1–4)
LEFT VENTRICLE DIASTOLIC VOLUME INDEX: 70.14 ML/M2
LEFT VENTRICLE DIASTOLIC VOLUME: 162.72 ML
LEFT VENTRICLE END DIASTOLIC VOLUME APICAL 2 CHAMBER: 90.54 ML
LEFT VENTRICLE END DIASTOLIC VOLUME APICAL 4 CHAMBER: 118.83 ML
LEFT VENTRICLE END SYSTOLIC VOLUME APICAL 2 CHAMBER: 70.23 ML
LEFT VENTRICLE END SYSTOLIC VOLUME APICAL 4 CHAMBER: 50.7 ML
LEFT VENTRICLE MASS INDEX: 79.2 G/M2
LEFT VENTRICLE SYSTOLIC VOLUME INDEX: 37.7 ML/M2
LEFT VENTRICLE SYSTOLIC VOLUME: 87.4 ML
LEFT VENTRICULAR INTERNAL DIMENSION IN DIASTOLE: 5.7 CM (ref 3.5–6)
LEFT VENTRICULAR MASS: 183.7 G
LV LATERAL E/E' RATIO: 6.2 M/S
LV SEPTAL E/E' RATIO: 8 M/S
LVED V (TEICH): 162.72 ML
LVES V (TEICH): 87.4 ML
LVOT MG: 1.54 MMHG
LVOT MV: 0.57 CM/S
MV PEAK A VEL: 0.78 M/S
MV PEAK E VEL: 0.56 M/S
MV STENOSIS PRESSURE HALF TIME: 62.93 MS
MV VALVE AREA P 1/2 METHOD: 3.5 CM2
OHS LV EJECTION FRACTION SIMPSONS BIPLANE MOD: 37 %
PISA TR MAX VEL: 1.8 M/S
PULM VEIN S/D RATIO: 1.18
PV MEAN GRADIENT: 1 MMHG
PV PEAK D VEL: 0.28 M/S
PV PEAK GRADIENT: 3 MMHG
PV PEAK S VEL: 0.33 M/S
PV PEAK VELOCITY: 0.88 M/S
RA MAJOR: 3.79 CM
RA PRESSURE ESTIMATED: 3 MMHG
RA WIDTH: 2.83 CM
RV TB RVSP: 5 MMHG
STJ: 2.8 CM
TDI LATERAL: 0.09 M/S
TDI SEPTAL: 0.07 M/S
TDI: 0.08 M/S
TR MAX PG: 13 MMHG
TRICUSPID ANNULAR PLANE SYSTOLIC EXCURSION: 1.85 CM
TV REST PULMONARY ARTERY PRESSURE: 16 MMHG
Z-SCORE OF LEFT VENTRICULAR DIMENSION IN END DIASTOLE: -4.85
Z-SCORE OF LEFT VENTRICULAR DIMENSION IN END SYSTOLE: -1.88

## 2025-01-28 PROCEDURE — 25500020 PHARM REV CODE 255: Mod: PO | Performed by: INTERNAL MEDICINE

## 2025-01-28 PROCEDURE — 93306 TTE W/DOPPLER COMPLETE: CPT | Mod: 26,,, | Performed by: INTERNAL MEDICINE

## 2025-01-28 PROCEDURE — 93306 TTE W/DOPPLER COMPLETE: CPT | Mod: PO

## 2025-01-28 RX ADMIN — PERFLUTREN 1.5 ML: 6.52 INJECTION, SUSPENSION INTRAVENOUS at 12:01

## 2025-01-29 ENCOUNTER — PATIENT MESSAGE (OUTPATIENT)
Dept: CARDIOLOGY | Facility: CLINIC | Age: 60
End: 2025-01-29
Payer: MEDICARE

## 2025-01-29 ENCOUNTER — TELEPHONE (OUTPATIENT)
Dept: CARDIOLOGY | Facility: CLINIC | Age: 60
End: 2025-01-29
Payer: MEDICARE

## 2025-01-29 NOTE — TELEPHONE ENCOUNTER
Attempted both numbers in chart, one is not available, and no voicemail for the other----- Message from Armando Serra MD sent at 1/28/2025  9:29 PM CST -----  Echo shows no changes, continue current meds

## 2025-01-30 ENCOUNTER — PATIENT MESSAGE (OUTPATIENT)
Dept: CARDIOLOGY | Facility: HOSPITAL | Age: 60
End: 2025-01-30
Payer: MEDICARE

## 2025-04-14 ENCOUNTER — CLINICAL SUPPORT (OUTPATIENT)
Dept: CARDIOLOGY | Facility: HOSPITAL | Age: 60
End: 2025-04-14
Attending: INTERNAL MEDICINE
Payer: MEDICARE

## 2025-04-14 DIAGNOSIS — I48.0 PAROXYSMAL ATRIAL FIBRILLATION: ICD-10-CM

## 2025-04-14 DIAGNOSIS — Z95.810 ICD (IMPLANTABLE CARDIOVERTER-DEFIBRILLATOR) IN PLACE: ICD-10-CM

## 2025-04-14 PROCEDURE — 93283 PRGRMG EVAL IMPLANTABLE DFB: CPT | Mod: PO

## 2025-04-16 DIAGNOSIS — I48.0 PAROXYSMAL ATRIAL FIBRILLATION: Primary | ICD-10-CM

## 2025-04-16 DIAGNOSIS — Z95.810 ICD (IMPLANTABLE CARDIOVERTER-DEFIBRILLATOR) IN PLACE: ICD-10-CM

## 2025-04-16 DIAGNOSIS — I50.42 CHRONIC COMBINED SYSTOLIC AND DIASTOLIC CONGESTIVE HEART FAILURE: ICD-10-CM

## 2025-04-16 LAB
AV DELAY - LONGEST: 200 MSEC
AV DELAY - SHORTEST: 180 MSEC
BATTERY VOLTAGE (V): 2.96 V
HV IMPEDANCE (OHM): NORMAL OHM
IMPEDANCE RA LEAD (NATIVE): 513 OHMS
IMPEDANCE RA LEAD: 399 OHMS
OHS CV DC PP MS1: 0.4 MS
OHS CV DC PP MS2: 0.4 MS
OHS CV DC PP V1: 1.5 V
OHS CV DC PP V2: 1.5 V
P/R-WAVE RA LEAD (NATIVE): 12.6 MV
P/R-WAVE RA LEAD: 4.5 MV
THRESHOLD MS RA LEAD (NATIVE): 0.4 MS
THRESHOLD MS RA LEAD: 0.4 MS
THRESHOLD V RA LEAD (NATIVE): 0.75 V
THRESHOLD V RA LEAD: 0.75 V

## 2025-04-30 ENCOUNTER — CLINICAL SUPPORT (OUTPATIENT)
Dept: CARDIOLOGY | Facility: HOSPITAL | Age: 60
End: 2025-04-30
Payer: MEDICARE

## 2025-04-30 ENCOUNTER — CLINICAL SUPPORT (OUTPATIENT)
Dept: CARDIOLOGY | Facility: HOSPITAL | Age: 60
End: 2025-04-30
Attending: INTERNAL MEDICINE
Payer: MEDICARE

## 2025-04-30 PROCEDURE — 93296 REM INTERROG EVL PM/IDS: CPT | Performed by: INTERNAL MEDICINE

## 2025-04-30 PROCEDURE — 93295 DEV INTERROG REMOTE 1/2/MLT: CPT | Mod: S$GLB,,, | Performed by: INTERNAL MEDICINE

## 2025-05-18 LAB
OHS CV AF BURDEN PERCENT: < 1
OHS CV DC REMOTE DEVICE TYPE: NORMAL
OHS CV RV PACING PERCENT: 0.03 %

## 2025-06-03 ENCOUNTER — PATIENT MESSAGE (OUTPATIENT)
Dept: CARDIOLOGY | Facility: CLINIC | Age: 60
End: 2025-06-03
Payer: MEDICARE

## 2025-07-24 ENCOUNTER — OFFICE VISIT (OUTPATIENT)
Dept: CARDIOLOGY | Facility: CLINIC | Age: 60
End: 2025-07-24
Payer: MEDICARE

## 2025-07-24 VITALS
OXYGEN SATURATION: 100 % | WEIGHT: 220.81 LBS | HEART RATE: 62 BPM | HEIGHT: 76 IN | BODY MASS INDEX: 26.89 KG/M2 | DIASTOLIC BLOOD PRESSURE: 66 MMHG | SYSTOLIC BLOOD PRESSURE: 122 MMHG

## 2025-07-24 DIAGNOSIS — Z95.810 ICD (IMPLANTABLE CARDIOVERTER-DEFIBRILLATOR) IN PLACE: ICD-10-CM

## 2025-07-24 DIAGNOSIS — I48.0 PAROXYSMAL ATRIAL FIBRILLATION: Primary | ICD-10-CM

## 2025-07-24 DIAGNOSIS — E78.2 MIXED HYPERLIPIDEMIA: ICD-10-CM

## 2025-07-24 DIAGNOSIS — I25.10 CORONARY ARTERY DISEASE INVOLVING NATIVE CORONARY ARTERY OF NATIVE HEART WITHOUT ANGINA PECTORIS: ICD-10-CM

## 2025-07-24 DIAGNOSIS — I25.3 LEFT VENTRICULAR ANEURYSM: ICD-10-CM

## 2025-07-24 DIAGNOSIS — I50.42 CHRONIC COMBINED SYSTOLIC AND DIASTOLIC CONGESTIVE HEART FAILURE: ICD-10-CM

## 2025-07-24 PROCEDURE — 3008F BODY MASS INDEX DOCD: CPT | Mod: CPTII,S$GLB,, | Performed by: INTERNAL MEDICINE

## 2025-07-24 PROCEDURE — 3074F SYST BP LT 130 MM HG: CPT | Mod: CPTII,S$GLB,, | Performed by: INTERNAL MEDICINE

## 2025-07-24 PROCEDURE — 4010F ACE/ARB THERAPY RXD/TAKEN: CPT | Mod: CPTII,S$GLB,, | Performed by: INTERNAL MEDICINE

## 2025-07-24 PROCEDURE — 1159F MED LIST DOCD IN RCRD: CPT | Mod: CPTII,S$GLB,, | Performed by: INTERNAL MEDICINE

## 2025-07-24 PROCEDURE — 99999 PR PBB SHADOW E&M-EST. PATIENT-LVL IV: CPT | Mod: PBBFAC,,, | Performed by: INTERNAL MEDICINE

## 2025-07-24 PROCEDURE — 3078F DIAST BP <80 MM HG: CPT | Mod: CPTII,S$GLB,, | Performed by: INTERNAL MEDICINE

## 2025-07-24 PROCEDURE — 1160F RVW MEDS BY RX/DR IN RCRD: CPT | Mod: CPTII,S$GLB,, | Performed by: INTERNAL MEDICINE

## 2025-07-24 PROCEDURE — 99214 OFFICE O/P EST MOD 30 MIN: CPT | Mod: S$GLB,,, | Performed by: INTERNAL MEDICINE

## 2025-07-24 NOTE — PROGRESS NOTES
Subjective:   Patient ID:  Jose Collado is a 59 y.o. male who presents for follow-up of No chief complaint on file.  1-25  Pt presents to [facility], seeing transplant team  59 year old male with history of ICM w LVEF 20% s/p PCI to LAD (June 2018) s/p AICD, HTN      Pt walks 2-3 miles /day, no sx    Today 7-24-25  Echo 1-25   Left Ventricle: The left ventricle is mildly dilated. Regional wall motion abnormalities present. There is severely reduced systolic function. Ejection fraction is approximately 25%. Grade I diastolic dysfunction.    Right Ventricle: Normal right ventricular cavity size. Systolic function is normal. Pacemaker lead present in the ventricle.    Left Atrium: Left atrium is mildly dilated.    Mitral Valve: There is mild regurgitation.    Pulmonary Artery: The estimated pulmonary artery systolic pressure is 16 mmHg.    IVC/SVC: Normal venous pressure at 3 mmHg.    Patient denies CP, angina or anginal equivalent.  AICD check ok       Coronary Artery Disease  Presents for follow-up visit. Pertinent negatives include no chest pain, chest pressure, chest tightness, dizziness, leg swelling, muscle weakness, palpitations, shortness of breath or weight gain. The symptoms have been stable. Compliance with diet is good. Compliance with exercise is good. Compliance with medications is good.   Congestive Heart Failure  Presents for follow-up visit. Pertinent negatives include no chest pain, chest pressure, muscle weakness, palpitations or shortness of breath. The symptoms have been stable. Compliance with total regimen is %. Compliance with diet is %. Compliance with exercise is %. Compliance with medications is %.       Review of Systems   Constitutional: Negative. Negative for weight gain.   HENT: Negative.     Eyes: Negative.    Cardiovascular: Negative.  Negative for chest pain, leg swelling and palpitations.   Respiratory: Negative.  Negative for chest tightness and shortness  of breath.    Endocrine: Negative.    Hematologic/Lymphatic: Negative.    Skin: Negative.    Musculoskeletal:  Negative for muscle weakness.   Gastrointestinal: Negative.    Genitourinary: Negative.    Neurological: Negative.  Negative for dizziness.   Psychiatric/Behavioral: Negative.     Allergic/Immunologic: Negative.    All other systems reviewed and are negative.    Family History   Problem Relation Name Age of Onset    Hypertension Mother       Past Medical History:   Diagnosis Date    Acute hypoxemic respiratory failure     Acute kidney injury     Aneurysm     CHF (congestive heart failure)     Coronary artery disease     Diabetic amyotrophy associated with type 2 diabetes mellitus     Edema due to congestive heart failure     Fever     Heart failure     High blood pressure     History of respiratory failure     Hypertension     ICD (implantable cardioverter-defibrillator) in place     Inflammation of lung     Irregular heartbeat     SOB (shortness of breath)      Social History[1]  Medications Ordered Prior to Encounter[2]  Review of patient's allergies indicates:  No Known Allergies    Objective:     Physical Exam  Vitals and nursing note reviewed.   Constitutional:       Appearance: He is well-developed.   HENT:      Head: Normocephalic and atraumatic.   Eyes:      Conjunctiva/sclera: Conjunctivae normal.      Pupils: Pupils are equal, round, and reactive to light.   Cardiovascular:      Rate and Rhythm: Normal rate and regular rhythm.      Pulses: Intact distal pulses.      Heart sounds: Normal heart sounds.   Pulmonary:      Effort: Pulmonary effort is normal.      Breath sounds: Normal breath sounds.   Abdominal:      General: Bowel sounds are normal.      Palpations: Abdomen is soft.   Musculoskeletal:      Cervical back: Normal range of motion and neck supple.   Skin:     General: Skin is warm and dry.   Neurological:      Mental Status: He is alert and oriented to person, place, and time.          Assessment:     1. Paroxysmal atrial fibrillation    2. ICD (implantable cardioverter-defibrillator) in place    3. Left ventricular aneurysm    4. Mixed hyperlipidemia    5. Coronary artery disease involving native coronary artery of native heart without angina pectoris    6. Chronic combined systolic and diastolic congestive heart failure        Plan:     Paroxysmal atrial fibrillation    ICD (implantable cardioverter-defibrillator) in place    Left ventricular aneurysm    Mixed hyperlipidemia    Coronary artery disease involving native coronary artery of native heart without angina pectoris    Chronic combined systolic and diastolic congestive heart failure    Continue entresto, farxiga, metoprolol, aldactone- CMY/CHF  Continue statin- HLP  Continue asa- CAD  Continue xarelto- PAF           [1]   Social History  Socioeconomic History    Marital status:    Tobacco Use    Smoking status: Never    Smokeless tobacco: Never   Substance and Sexual Activity    Alcohol use: No    Drug use: No    Sexual activity: Never     Social Drivers of Health     Financial Resource Strain: Low Risk  (7/24/2025)    Overall Financial Resource Strain (CARDIA)     Difficulty of Paying Living Expenses: Not very hard   Food Insecurity: No Food Insecurity (7/24/2025)    Hunger Vital Sign     Worried About Running Out of Food in the Last Year: Never true     Ran Out of Food in the Last Year: Never true   Transportation Needs: No Transportation Needs (7/24/2025)    PRAPARE - Transportation     Lack of Transportation (Medical): No     Lack of Transportation (Non-Medical): No   Physical Activity: Sufficiently Active (7/24/2025)    Exercise Vital Sign     Days of Exercise per Week: 5 days     Minutes of Exercise per Session: 100 min   Stress: Stress Concern Present (7/24/2025)    [employer] [place] [employer] - Occupational Stress Questionnaire     Feeling of Stress : To some extent   Housing Stability: Low Risk   (7/24/2025)    Housing Stability Vital Sign     Unable to Pay for Housing in the Last Year: No     Homeless in the Last Year: No   [2]   Current Outpatient Medications on File Prior to Visit   Medication Sig Dispense Refill    allopurinoL (ZYLOPRIM) 100 MG tablet       aspirin (ECOTRIN) 81 MG EC tablet Take 1 tablet (81 mg total) by mouth once daily. 30 tablet 6    atorvastatin (LIPITOR) 80 MG tablet Take 1 tablet (80 mg total) by mouth every evening. 90 tablet 3    EASY TOUCH TWIST LANCETS 30 gauge Misc       FARXIGA 10 mg tablet Take 1 tablet (10 mg total) by mouth once daily. 90 tablet 3    furosemide (LASIX) 40 MG tablet Take 1 tablet (40 mg total) by mouth once daily. 90 tablet 3    metoprolol succinate (TOPROL-XL) 200 MG 24 hr tablet Take 1 tablet (200 mg total) by mouth once daily. 90 tablet 3    MITIGARE 0.6 mg Cap       pantoprazole (PROTONIX) 40 MG tablet TAKE 1 TABLET BY MOUTH EVERY DAY 30 tablet 3    potassium chloride SA (K-DUR,KLOR-CON) 20 MEQ tablet Take 40 mEq by mouth once daily.      rivaroxaban (XARELTO) 20 mg Tab Take 1 tablet (20 mg total) by mouth once daily. 90 tablet 3    sacubitriL-valsartan (ENTRESTO)  mg per tablet Take 1 tablet by mouth 2 (two) times daily. 180 tablet 3    spironolactone (ALDACTONE) 50 MG tablet Take 1 tablet (50 mg total) by mouth once daily. 90 tablet 3    traZODone (DESYREL) 50 MG tablet TAKE 1 TABLET BY MOUTH ATBEDTIME 30 tablet 1    TRUE METRIX GLUCOSE TEST STRIP Strp        No current facility-administered medications on file prior to visit.

## 2025-07-30 ENCOUNTER — CLINICAL SUPPORT (OUTPATIENT)
Dept: CARDIOLOGY | Facility: HOSPITAL | Age: 60
End: 2025-07-30
Payer: MEDICARE

## 2025-07-30 ENCOUNTER — CLINICAL SUPPORT (OUTPATIENT)
Dept: CARDIOLOGY | Facility: HOSPITAL | Age: 60
End: 2025-07-30
Attending: INTERNAL MEDICINE
Payer: MEDICARE

## 2025-07-30 DIAGNOSIS — Z95.810 PRESENCE OF AUTOMATIC (IMPLANTABLE) CARDIAC DEFIBRILLATOR: ICD-10-CM

## 2025-07-30 PROCEDURE — 93295 DEV INTERROG REMOTE 1/2/MLT: CPT | Mod: S$GLB,,, | Performed by: INTERNAL MEDICINE

## 2025-07-30 PROCEDURE — 93296 REM INTERROG EVL PM/IDS: CPT | Performed by: INTERNAL MEDICINE

## 2025-08-10 RX ORDER — TRAZODONE HYDROCHLORIDE 50 MG/1
50 TABLET ORAL NIGHTLY
Qty: 30 TABLET | Refills: 1 | Status: SHIPPED | OUTPATIENT
Start: 2025-08-10

## 2025-08-14 LAB
OHS CV AF BURDEN PERCENT: < 1
OHS CV DC REMOTE DEVICE TYPE: NORMAL
OHS CV RV PACING PERCENT: 0.03 %